# Patient Record
Sex: FEMALE | Race: WHITE | Employment: UNEMPLOYED | URBAN - METROPOLITAN AREA
[De-identification: names, ages, dates, MRNs, and addresses within clinical notes are randomized per-mention and may not be internally consistent; named-entity substitution may affect disease eponyms.]

---

## 2017-09-20 ENCOUNTER — HOSPITAL ENCOUNTER (OUTPATIENT)
Dept: MRI IMAGING | Age: 47
Discharge: HOME | End: 2017-09-20
Attending: ORTHOPAEDIC SURGERY
Payer: COMMERCIAL

## 2017-09-20 DIAGNOSIS — M79.672 BILATERAL FOOT PAIN: ICD-10-CM

## 2017-09-20 DIAGNOSIS — M79.671 BILATERAL FOOT PAIN: ICD-10-CM

## 2017-09-20 PROCEDURE — 73718 MRI LOWER EXTREMITY W/O DYE: CPT

## 2017-12-04 ENCOUNTER — HOSPITAL ENCOUNTER (OUTPATIENT)
Dept: ULTRASOUND IMAGING | Age: 47
Discharge: HOME | End: 2017-12-04
Attending: FAMILY MEDICINE
Payer: COMMERCIAL

## 2017-12-04 DIAGNOSIS — E04.1 SOLITARY THYROID NODULE: ICD-10-CM

## 2017-12-04 PROCEDURE — 76536 US EXAM OF HEAD AND NECK: CPT

## 2017-12-12 ENCOUNTER — OFFICE VISIT (OUTPATIENT)
Dept: NEUROLOGY | Age: 47
End: 2017-12-12

## 2017-12-12 VITALS — DIASTOLIC BLOOD PRESSURE: 80 MMHG | WEIGHT: 121 LBS | BODY MASS INDEX: 19.09 KG/M2 | SYSTOLIC BLOOD PRESSURE: 124 MMHG

## 2017-12-12 DIAGNOSIS — R32 BOWEL AND BLADDER INCONTINENCE: ICD-10-CM

## 2017-12-12 DIAGNOSIS — R20.9 SKIN SENSATION DISTURBANCE: ICD-10-CM

## 2017-12-12 DIAGNOSIS — R20.2 PARESTHESIA OF BOTH FEET: Primary | ICD-10-CM

## 2017-12-12 DIAGNOSIS — R20.2 PARESTHESIA OF BOTH HANDS: ICD-10-CM

## 2017-12-12 DIAGNOSIS — R15.9 BOWEL AND BLADDER INCONTINENCE: ICD-10-CM

## 2017-12-12 RX ORDER — GABAPENTIN 300 MG/1
300 CAPSULE ORAL 3 TIMES DAILY
Qty: 90 CAP | Refills: 2 | Status: SHIPPED | OUTPATIENT
Start: 2017-12-12 | End: 2018-01-11

## 2017-12-12 NOTE — PATIENT INSTRUCTIONS

## 2017-12-12 NOTE — LETTER
12/12/2017 2:57 PM 
 
Patient:  Debbie Pereyra YOB: 1970 Date of Visit: 12/12/2017 Dear Uriel Perea MD 
[address] [place] [address] VIA Facsimile: [phone] 
 : 
 
 
Thank you for referring Ms. Em Osman to me for evaluation/treatment. Below are the relevant portions of my assessment and plan of care. If you have questions, please do not hesitate to call me. I look forward to following Ms. Calderon along with you.  
 
 
 
Sincerely, 
 
 
Yakov Richardson, DO

## 2017-12-12 NOTE — PROGRESS NOTES
Tingling/Numbness x18 months  Comes and goes  Bowel incontinence x1 year   Memory problems- Seeing Niya Zaman in April 2018

## 2017-12-12 NOTE — MR AVS SNAPSHOT
Visit Information Date & Time Provider Department Dept. Phone Encounter #  
 12/12/2017  2:00 PM [facility] at [address] [place] [address] Road [id number] Follow-up Instructions Return in about 6 weeks (around 1/23/2018). Your Appointments 4/27/2018  2:20 PM  
New Patient with Nolberto Weber PsyD 1989 [facility] ([facility]) Appt Note: NP Psyc Test Eval CP:PD: 11/21/17 DJ REF: Dr.Short Sheridan [address] [facility] 99 [id number] [phone]  
  
   
 Fatimah 1921 [facility] [address] [id number] [address] Chloride Upcoming Health Maintenance Date Due Pneumococcal 19-64 Medium Risk (1 of 1 - PPSV23) 7/28/1991 DTaP/Tdap/Td series (1 - Tdap) 7/28/1993 PAP AKA CERVICAL CYTOLOGY 1/16/2015 Influenza Age 5 to Adult 8/1/2017 Allergies as of 12/12/2017  Review Complete On: 12/12/2017 By: Calista Baum,  No Known Allergies Current Immunizations  Never Reviewed No immunizations on file. Not reviewed this visit You Were Diagnosed With   
  
 Codes Comments Paresthesia of both feet    -  Primary ICD-10-CM: R20.2 ICD-9-CM: 782.0 Paresthesia of both hands     ICD-10-CM: R20.2 ICD-9-CM: 782.0 Skin sensation disturbance     ICD-10-CM: R20.9 ICD-9-CM: 782.0 Bowel and bladder incontinence     ICD-10-CM: R32, R15.9 ICD-9-CM: 788.30, 787.60 Vitals BP Weight(growth percentile) BMI OB Status Smoking Status 124/80 121 lb (54.9 kg) 19.09 kg/m2 Having regular periods Former Smoker BMI and BSA Data Body Mass Index Body Surface Area 19.09 kg/m 2 1.61 m 2 Preferred Pharmacy Pharmacy Name Phone [id number] [place] [address] [facility] [phone] Your Updated Medication List  
  
   
This list is accurate as of: 12/12/17  2:31 PM.  Always use your most recent med list.  
  
  
  
  
 escitalopram oxalate 10 mg tablet Commonly known as:  Mayo Hernandez TAKE 1 TABLET BY MOUTH DAILY We Performed the Following COPPER [id number] CPT(R)] GGT [91721 CPT(R)] HEMOGLOBIN A1C WITH EAG [96857 CPT(R)] METHYLMALONIC ACID [05083 CPT(R)] PTH INTACT [86502 CPT(R)] REFERRAL TO NEUROLOGY [VOB33 Custom] Comments:  
 Please evaluate patient for skin biopsy. To be scheduled after MRI's. THYROID PANEL W/TSH [10672 CPT(R)] VITAMIN B1, WHOLE BLOOD [id number] CPT(R)] VITAMIN B12 [id number] CPT(R)] VITAMIN B6 H2370300 CPT(R)] ZINC [id number] CPT(R)] Follow-up Instructions Return in about 6 weeks (around 1/23/2018). To-Do List   
 12/12/2017 Imaging:  MRI BRAIN W WO CONT   
  
 12/12/2017 Imaging:  MRI CERV SPINE W WO CONT   
  
 12/12/2017 Imaging:  MRI [facility] SPINE W WO CONT Referral Information Referral ID Referred By Referred To  
  
 [id number] Ant Brewer MD   
   [address] [place] [address] Phone: [phone] Fax: [phone] Visits Status Start Date End Date 1 New Request 12/12/17 12/12/18 If your referral has a status of pending review or denied, additional information will be sent to support the outcome of this decision. Patient Instructions A Healthy Lifestyle: Care Instructions Your Care Instructions A healthy lifestyle can help you feel good, stay at a healthy weight, and have plenty of energy for both work and play. A healthy lifestyle is something you can share with your whole family. A healthy lifestyle also can lower your risk for serious health problems, such as high blood pressure, heart disease, and diabetes. You can follow a few steps listed below to improve your health and the health of your family. Follow-up care is a key part of your treatment and safety.  Be sure to make and go to all appointments, and call your doctor if you are having problems. It's also a good idea to know your test results and keep a list of the medicines you take. How can you care for yourself at home? · Do not eat too much sugar, fat, or fast foods. You can still have dessert and treats now and then. The goal is moderation. · Start small to improve your eating habits. Pay attention to portion sizes, drink less juice and soda pop, and eat more fruits and vegetables. ¨ Eat a healthy amount of food. A 3-ounce serving of meat, for example, is about the size of a deck of cards. Fill the rest of your plate with vegetables and whole grains. ¨ Limit the amount of soda and sports drinks you have every day. Drink more water when you are thirsty. ¨ Eat at least 5 servings of fruits and vegetables every day. It may seem like a lot, but it is not hard to reach this goal. A serving or helping is 1 piece of fruit, 1 cup of vegetables, or 2 cups of leafy, raw vegetables. Have an apple or some carrot sticks as an afternoon snack instead of a candy bar. Try to have fruits and/or vegetables at every meal. 
· Make exercise part of your daily routine. You may want to start with simple activities, such as walking, bicycling, or slow swimming. Try to be active 30 to 60 minutes every day. You do not need to do all 30 to 60 minutes all at once. For example, you can exercise 3 times a day for 10 or 20 minutes. Moderate exercise is safe for most people, but it is always a good idea to talk to your doctor before starting an exercise program. 
· Keep moving. [place] Feeling the lawn, work in the garden, or 2Catalyze. Take the stairs instead of the elevator at work. · If you smoke, quit. People who smoke have an increased risk for heart attack, stroke, cancer, and other lung illnesses. Quitting is hard, but there are ways to boost your chance of quitting tobacco for good. ¨ Use nicotine gum, patches, or lozenges. ¨ Ask your doctor about stop-smoking programs and medicines. ¨ Keep trying. In addition to reducing your risk of diseases in the future, you will notice some benefits soon after you stop using tobacco. If you have shortness of breath or asthma symptoms, they will likely get better within a few weeks after you quit. · Limit how much alcohol you drink. Moderate amounts of alcohol (up to 2 drinks a day for men, 1 drink a day for women) are okay. But drinking too much can lead to liver problems, high blood pressure, and other health problems. Family health If you have a family, there are many things you can do together to improve your health. · Eat meals together as a family as often as possible. · Eat healthy foods. This includes fruits, vegetables, lean meats and dairy, and whole grains. · Include your family in your fitness plan. Most people think of activities such as jogging or tennis as the way to fitness, but there are many ways you and your family can be more active. Anything that makes you breathe hard and gets your heart pumping is exercise. Here are some tips: 
¨ Walk to do errands or to take your child to school or the bus. ¨ Go for a family bike ride after dinner instead of watching TV. Where can you learn more? Go to http://jose-vidal.info/. Enter R462 in the search box to learn more about \"A Healthy Lifestyle: Care Instructions. \" Current as of: May 12, 2017 Content Version: 11.4 © 7444-2906 [employer]. Care instructions adapted under license by [employer] (which disclaims liability or warranty for this information). If you have questions about a medical condition or this instruction, always ask your healthcare professional. [address] any warranty or liability for your use of this information. Introducing [facility]! Dear Leni Ellsworth: Thank you for requesting a Solmentum account.   Our records indicate that you already have an active EmSense account. You can access your account anytime at [email or web address] Did you know that you can access your hospital and ER discharge instructions at any time in EmSense? You can also review all of your test results from your hospital stay or ER visit. Additional Information If you have questions, please visit the Frequently Asked Questions section of the EmSense website at [email or web address] Remember, EmSense is NOT to be used for urgent needs. For medical emergencies, dial 911. Now available from your iPhone and Android! Please provide this summary of care documentation to your next provider. Your primary care clinician is listed as Marco Wong. If you have any questions after today's visit, please call [phone].
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
## 2017-12-15 LAB
COPPER SERPL-MCNC: 85 UG/DL (ref 72–166)
EST. AVERAGE GLUCOSE BLD GHB EST-MCNC: 97 MG/DL
FT4I SERPL CALC-MCNC: 1.6 (ref 1.2–4.9)
GGT SERPL-CCNC: 13 IU/L (ref 0–60)
HBA1C MFR BLD: 5 % (ref 4.8–5.6)
PTH-INTACT SERPL-MCNC: 38 PG/ML (ref 15–65)
T3RU NFR SERPL: 27 % (ref 24–39)
T4 SERPL-MCNC: 6 UG/DL (ref 4.5–12)
TSH SERPL DL<=0.005 MIU/L-ACNC: 2.46 UIU/ML (ref 0.45–4.5)
VIT B1 BLD-SCNC: 77.5 NMOL/L (ref 66.5–200)
VIT B12 SERPL-MCNC: 432 PG/ML (ref 232–1245)
VIT B6 SERPL-MCNC: 15.2 UG/L (ref 2–32.8)
ZINC SERPL-MCNC: 52 UG/DL (ref 56–134)

## 2017-12-23 ENCOUNTER — HOSPITAL ENCOUNTER (OUTPATIENT)
Dept: MRI IMAGING | Age: 47
Discharge: HOME | End: 2017-12-23
Attending: PSYCHIATRY & NEUROLOGY
Payer: COMMERCIAL

## 2017-12-23 DIAGNOSIS — R20.2 PARESTHESIA OF BOTH HANDS: ICD-10-CM

## 2017-12-23 DIAGNOSIS — R20.2 PARESTHESIA OF BOTH FEET: ICD-10-CM

## 2017-12-23 DIAGNOSIS — R32 BOWEL AND BLADDER INCONTINENCE: ICD-10-CM

## 2017-12-23 DIAGNOSIS — R15.9 BOWEL AND BLADDER INCONTINENCE: ICD-10-CM

## 2017-12-23 DIAGNOSIS — R20.9 SKIN SENSATION DISTURBANCE: ICD-10-CM

## 2017-12-23 PROCEDURE — 72157 MRI CHEST SPINE W/O & W/DYE: CPT

## 2017-12-23 PROCEDURE — 74011250636 HC RX REV CODE- 250/636: Performed by: PSYCHIATRY & NEUROLOGY

## 2017-12-23 PROCEDURE — A9576 INJ PROHANCE MULTIPACK: HCPCS | Performed by: PSYCHIATRY & NEUROLOGY

## 2017-12-23 PROCEDURE — 72156 MRI NECK SPINE W/O & W/DYE: CPT

## 2017-12-23 PROCEDURE — 70553 MRI BRAIN STEM W/O & W/DYE: CPT

## 2017-12-23 RX ADMIN — GADOTERIDOL 11 ML: 279.3 INJECTION, SOLUTION INTRAVENOUS at 13:00

## 2017-12-26 NOTE — TELEPHONE ENCOUNTER
Requested Prescriptions     Pending Prescriptions Disp Refills    gabapentin (NEURONTIN) 300 mg capsule 90 Cap 2     Sig: Take 1 Cap by mouth three (3) times daily.

## 2017-12-27 ENCOUNTER — TELEPHONE (OUTPATIENT)
Dept: NEUROLOGY | Age: 47
End: 2017-12-27

## 2017-12-27 NOTE — TELEPHONE ENCOUNTER
Spoke with patient, she wanted to know if she could increase gabapentin to 600mg 4x a day, also would like MRI results if possible.

## 2017-12-28 ENCOUNTER — TELEPHONE (OUTPATIENT)
Dept: NEUROLOGY | Age: 47
End: 2017-12-28

## 2017-12-28 RX ORDER — GABAPENTIN 300 MG/1
300 CAPSULE ORAL 3 TIMES DAILY
Qty: 90 CAP | Refills: 2 | OUTPATIENT
Start: 2017-12-28

## 2017-12-28 NOTE — TELEPHONE ENCOUNTER
Spoke with patient, informed her  wants her to increase gabapentin to 600mg three times a day, MRI results reviewed and skin biopsy scheduled with  on January 5th.

## 2018-01-02 ENCOUNTER — TELEPHONE (OUTPATIENT)
Dept: NEUROLOGY | Age: 48
End: 2018-01-02

## 2018-01-02 RX ORDER — GABAPENTIN 300 MG/1
600 CAPSULE ORAL 3 TIMES DAILY
Qty: 60 CAP | Refills: 2 | Status: SHIPPED | OUTPATIENT
Start: 2018-01-02 | End: 2018-01-02 | Stop reason: CLARIF

## 2018-01-02 RX ORDER — GABAPENTIN 300 MG/1
600 CAPSULE ORAL 3 TIMES DAILY
Qty: 180 CAP | Refills: 2 | Status: ON HOLD | OUTPATIENT
Start: 2018-01-02 | End: 2021-11-23

## 2018-01-02 NOTE — TELEPHONE ENCOUNTER
Patient c/o muscle spasms to hands and feet which just started this past week. Family states cognition issues are getting worse, she wanted to know if anything can be done before follow up on Jan 24th.

## 2018-01-02 NOTE — TELEPHONE ENCOUNTER
Pt requesting a returned phone call in regards to medication refill of gabapentin 600mg 3x day. Please try pt on both home and mobile.

## 2018-01-02 NOTE — TELEPHONE ENCOUNTER
----- Message from Lux Beatty sent at 1/2/2018 12:48 PM EST -----  Regarding: Dr. Ananya Howard  Pt is returning a call to [facility] regarding a  prescription. Pt would like a call back. Pt can be reached at [phone].

## 2018-01-11 ENCOUNTER — OFFICE VISIT (OUTPATIENT)
Dept: NEUROLOGY | Age: 48
End: 2018-01-11

## 2018-01-11 VITALS
RESPIRATION RATE: 18 BRPM | SYSTOLIC BLOOD PRESSURE: 126 MMHG | OXYGEN SATURATION: 98 % | DIASTOLIC BLOOD PRESSURE: 70 MMHG | HEART RATE: 74 BPM

## 2018-01-11 DIAGNOSIS — R20.2 PARESTHESIA: Primary | ICD-10-CM

## 2018-01-11 DIAGNOSIS — R15.9 INCONTINENCE OF FECES, UNSPECIFIED FECAL INCONTINENCE TYPE: ICD-10-CM

## 2018-01-11 DIAGNOSIS — R41.3 COMPLAINTS OF MEMORY DISTURBANCE: ICD-10-CM

## 2018-01-11 DIAGNOSIS — F41.9 ANXIETY: ICD-10-CM

## 2018-01-11 DIAGNOSIS — R20.2 PARESTHESIA: ICD-10-CM

## 2018-01-11 RX ORDER — AMITRIPTYLINE HYDROCHLORIDE 10 MG/1
TABLET, FILM COATED ORAL
COMMUNITY
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
## 2018-01-11 NOTE — PROGRESS NOTES
Numbness/tingling remain  Jerking to arms/hands x2 weeks   Incontinence to bowel  Back pain  Loose  to hands  Muscle spasms to BLE  Difficulty focusing

## 2018-01-11 NOTE — LETTER
1/11/2018 3:17 PM 
 
Patient:  Massiel Napier YOB: 1970 Date of Visit: 1/11/2018 Dear Latrell Celis MD 
[address] [place] [address] VIA Facsimile: [phone] 
 : 
 
 
I recently had the pleasure of seeing Ms. Capri Dumas for evaluation/treatment. Below are the relevant portions of my assessment and plan of care. If you have questions, please do not hesitate to call me. I look forward to following Ms. Calderon along with you.  
 
 
 
Sincerely, 
 
 
Deysi Bergeron, DO

## 2018-01-11 NOTE — PATIENT INSTRUCTIONS

## 2018-01-11 NOTE — MR AVS SNAPSHOT
Visit Information Date & Time Provider Department Dept. Phone Encounter #  
 1/11/2018  2:30 PM Jose Francisco Lazar, [address] [facility] at [place] [id number] Your Appointments 4/27/2018  2:20 PM  
New Patient with Elidia Almeida PsyD [address] [facility] [address] ([facility]) Appt Note: NP Psyc Test Eval CP:PD: 11/21/17 DJ REF: Dr.Short Sheridan 1921 [address] [address] [phone]  
  
   
 [place] [address] [id number] [address] Upcoming Health Maintenance Date Due Pneumococcal 19-64 Medium Risk (1 of 1 - PPSV23) 7/28/1991 DTaP/Tdap/Td series (1 - Tdap) 7/28/1993 PAP AKA CERVICAL CYTOLOGY 1/16/2015 Influenza Age 5 to Adult 8/1/2017 Allergies as of 1/11/2018  Review Complete On: 1/11/2018 By: Jose Francisco Lazar,  No Known Allergies Current Immunizations  Never Reviewed No immunizations on file. Not reviewed this visit You Were Diagnosed With   
  
 Codes Comments Paresthesia    -  Primary ICD-10-CM: R20.2 ICD-9-CM: 782.0 Incontinence of feces, unspecified fecal incontinence type     ICD-10-CM: R15.9 ICD-9-CM: 787.60 Complaints of memory disturbance     ICD-10-CM: R41.3 ICD-9-CM: 780.93 Anxiety     ICD-10-CM: F41.9 ICD-9-CM: 300.00 Vitals BP Pulse Resp SpO2 OB Status Smoking Status 126/70 74 18 98% Having regular periods Former Smoker Preferred Pharmacy Pharmacy Name Phone [id number] [address] Your Updated Medication List  
  
   
This list is accurate as of: 1/11/18  3:08 PM.  Always use your most recent med list.  
  
  
  
  
 amitriptyline 10 mg tablet Commonly known as:  ELAVIL Take  by mouth nightly. escitalopram oxalate 10 mg tablet Commonly known as:  Myra Charo TAKE 1 TABLET BY MOUTH DAILY gabapentin 300 mg capsule Commonly known as:  NEURONTIN Take 2 Caps by mouth three (3) times daily. We Performed the Following REYNALDO IFA W/REFLEX  CASCADE [54062 CPT(R)] REFERRAL TO NEUROLOGY [IDA38 Custom] Comments:  
 Please evaluate patient for skin biopsy To-Do List   
 01/11/2018 Lab:  [place] AB TOTAL W/RFLX W BLOT   
  
 01/11/2018 Imaging:  MRI LUMB SPINE W CONT Referral Information Referral ID Referred By Referred To  
  
 [id number] Mj Dailey MD   
   [address] [place] [address] Drive, [address] [place] [address] Phone: [phone] Fax: [phone] Visits Status Start Date End Date 1 New Request 1/11/18 1/11/19 If your referral has a status of pending review or denied, additional information will be sent to support the outcome of this decision. Patient Instructions A Healthy Lifestyle: Care Instructions Your Care Instructions A healthy lifestyle can help you feel good, stay at a healthy weight, and have plenty of energy for both work and play. A healthy lifestyle is something you can share with your whole family. A healthy lifestyle also can lower your risk for serious health problems, such as high blood pressure, heart disease, and diabetes. You can follow a few steps listed below to improve your health and the health of your family. Follow-up care is a key part of your treatment and safety. Be sure to make and go to all appointments, and call your doctor if you are having problems. It's also a good idea to know your test results and keep a list of the medicines you take. How can you care for yourself at home? · Do not eat too much sugar, fat, or fast foods. You can still have dessert and treats now and then. The goal is moderation. · Start small to improve your eating habits. Pay attention to portion sizes, drink less juice and soda pop, and eat more fruits and vegetables. ¨ Eat a healthy amount of food. A 3-ounce serving of meat, for example, is about the size of a deck of cards. Fill the rest of your plate with vegetables and whole grains. ¨ Limit the amount of soda and sports drinks you have every day. Drink more water when you are thirsty. ¨ Eat at least 5 servings of fruits and vegetables every day. It may seem like a lot, but it is not hard to reach this goal. A serving or helping is 1 piece of fruit, 1 cup of vegetables, or 2 cups of leafy, raw vegetables. Have an apple or some carrot sticks as an afternoon snack instead of a candy bar. Try to have fruits and/or vegetables at every meal. 
· Make exercise part of your daily routine. You may want to start with simple activities, such as walking, bicycling, or slow swimming. Try to be active 30 to 60 minutes every day. You do not need to do all 30 to 60 minutes all at once. For example, you can exercise 3 times a day for 10 or 20 minutes. Moderate exercise is safe for most people, but it is always a good idea to talk to your doctor before starting an exercise program. 
· Keep moving. [place] Harpin the lawn, work in the garden, or [place]. Take the stairs instead of the elevator at work. · If you smoke, quit. People who smoke have an increased risk for heart attack, stroke, cancer, and other lung illnesses. Quitting is hard, but there are ways to boost your chance of quitting tobacco for good. ¨ Use nicotine gum, patches, or lozenges. ¨ Ask your doctor about stop-smoking programs and medicines. ¨ Keep trying. In addition to reducing your risk of diseases in the future, you will notice some benefits soon after you stop using tobacco. If you have shortness of breath or asthma symptoms, they will likely get better within a few weeks after you quit. · Limit how much alcohol you drink. Moderate amounts of alcohol (up to 2 drinks a day for men, 1 drink a day for women) are okay.  But drinking too much can lead to liver problems, high blood pressure, and other health problems. Family health If you have a family, there are many things you can do together to improve your health. · Eat meals together as a family as often as possible. · Eat healthy foods. This includes fruits, vegetables, lean meats and dairy, and whole grains. · Include your family in your fitness plan. Most people think of activities such as jogging or tennis as the way to fitness, but there are many ways you and your family can be more active. Anything that makes you breathe hard and gets your heart pumping is exercise. Here are some tips: 
¨ Walk to do errands or to take your child to school or the bus. ¨ Go for a family bike ride after dinner instead of watching TV. Where can you learn more? Go to http://jose-vidal.info/. Enter D970 in the search box to learn more about \"A Healthy Lifestyle: Care Instructions. \" Current as of: May 12, 2017 Content Version: 11.4 © 4585-7926 VisualCV. Care instructions adapted under license by [employer] (which disclaims liability or warranty for this information). If you have questions about a medical condition or this instruction, always ask your healthcare professional. [address] any warranty or liability for your use of this information. Introducing [facility] [place] [facility]! Dear Robbie John: Thank you for requesting a [employer] account. Our records indicate that you already have an active [employer] account. You can access your account anytime at [email or web address] Did you know that you can access your hospital and ER discharge instructions at any time in Siemens? You can also review all of your test results from your hospital stay or ER visit. Additional Information If you have questions, please visit the Frequently Asked Questions section of the FeedHenry website at [email or web address] Remember, [employer] is NOT to be used for urgent needs. For medical emergencies, dial 911. Now available from your iPhone and Android! Please provide this summary of care documentation to your next provider. [place] Disease Testing Disclaimer:   
 § 73.1-6009.7. (Expires July 1, 2018) [place] disease testing information disclosure. A. Every licensee or his in-office designee who orders a laboratory test for the presence of [place] disease shall provide to the patient or his legal representative the following written information: \"ACCORDING TO THE CENTERS FOR DISEASE CONTROL AND PREVENTION, AS OF 2011 L[place] DISEASE IS THE SIXTH FASTEST GROWING DISEASE IN THE [place]. YOUR HEALTH CARE PROVIDER HAS ORDERED A LABORATORY TEST FOR THE PRESENCE OF [place]ME DISEASE FOR YOU. CURRENT LABORATORY TESTING FOR L[place]ME DISEASE CAN BE PROBLEMATIC AND STANDARD LABORATORY TESTS OFTEN RESULT IN FALSE NEGATIVE AND FALSE POSITIVE RESULTS, AND IF DONE TOO EARLY, YOU MAY NOT HAVE PRODUCED ENOUGH ANTIBODIES TO BE CONSIDERED POSITIVE BECAUSE YOUR IMMUNE RESPONSE REQUIRES TIME TO DEVELOP ANTIBODIES. IF YOU ARE TESTED FOR L[place]ME DISEASE, AND THE RESULTS ARE NEGATIVE, THIS DOES NOT NECESSARILY MEAN YOU DO NOT HAVE L[place]ME DISEASE. IF YOU CONTINUE TO EXPERIENCE SYMPTOMS, YOU SHOULD CONTACT YOUR HEALTH CARE PROVIDER AND INQUIRE ABOUT THE APPROPRIATENESS OF RETESTING OR ADDITIONAL TREATMENT. \"  
B. Licensees shall be immune from civil liability for the provision of the written information required by this section absent gross negligence or willful misconduct. Your primary care clinician is listed as Rodrigo Szymanski. If you have any questions after today's visit, please call [phone].

## 2018-01-12 ENCOUNTER — TELEPHONE (OUTPATIENT)
Dept: NEUROLOGY | Age: 48
End: 2018-01-12

## 2018-04-27 ENCOUNTER — OFFICE VISIT (OUTPATIENT)
Dept: NEUROLOGY | Age: 48
End: 2018-04-27

## 2018-04-27 DIAGNOSIS — R15.9 INCONTINENCE OF FECES, UNSPECIFIED FECAL INCONTINENCE TYPE: ICD-10-CM

## 2018-04-27 DIAGNOSIS — R47.89 WORD FINDING DIFFICULTY: ICD-10-CM

## 2018-04-27 DIAGNOSIS — F41.9 ANXIETY AND DEPRESSION: ICD-10-CM

## 2018-04-27 DIAGNOSIS — R41.3 MEMORY LOSS: ICD-10-CM

## 2018-04-27 DIAGNOSIS — R20.2 PARESTHESIA: ICD-10-CM

## 2018-04-27 DIAGNOSIS — F07.81 POST CONCUSSION SYNDROME: Primary | ICD-10-CM

## 2018-04-27 DIAGNOSIS — F10.21 ALCOHOL DEPENDENCE IN SUSTAINED FULL REMISSION (HCC): ICD-10-CM

## 2018-04-27 DIAGNOSIS — F32.A ANXIETY AND DEPRESSION: ICD-10-CM
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
## 2018-05-03 ENCOUNTER — OFFICE VISIT (OUTPATIENT)
Dept: NEUROLOGY | Age: 48
End: 2018-05-03

## 2018-05-03 DIAGNOSIS — F32.A DEPRESSION WITH SOMATIZATION: ICD-10-CM

## 2018-05-03 DIAGNOSIS — Z87.828 HISTORY OF TRAUMATIC HEAD INJURY: ICD-10-CM

## 2018-05-03 DIAGNOSIS — F10.21 ALCOHOL DEPENDENCE IN SUSTAINED FULL REMISSION (HCC): ICD-10-CM

## 2018-05-03 DIAGNOSIS — F45.0 DEPRESSION WITH SOMATIZATION: ICD-10-CM

## 2018-05-03 DIAGNOSIS — F07.81 POST CONCUSSION SYNDROME: Primary | ICD-10-CM

## 2018-05-03 DIAGNOSIS — Z63.0 RELATIONSHIP PROBLEM BETWEEN PARTNERS: ICD-10-CM

## 2018-05-03 DIAGNOSIS — F41.1 GENERALIZED ANXIETY DISORDER: ICD-10-CM

## 2018-05-03 SDOH — SOCIAL STABILITY - SOCIAL INSECURITY: PROBLEMS IN RELATIONSHIP WITH SPOUSE OR PARTNER: Z63.0
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
## 2021-02-14 ENCOUNTER — HOSPITAL ENCOUNTER (EMERGENCY)
Age: 51
Discharge: HOME | End: 2021-02-14
Attending: EMERGENCY MEDICINE | Admitting: EMERGENCY MEDICINE
Payer: COMMERCIAL

## 2021-02-14 ENCOUNTER — APPOINTMENT (OUTPATIENT)
Dept: GENERAL RADIOLOGY | Age: 51
End: 2021-02-14
Attending: EMERGENCY MEDICINE
Payer: COMMERCIAL

## 2021-02-14 VITALS
HEIGHT: 68 IN | TEMPERATURE: 98.1 F | OXYGEN SATURATION: 98 % | HEART RATE: 95 BPM | WEIGHT: 116.4 LBS | BODY MASS INDEX: 17.64 KG/M2 | RESPIRATION RATE: 12 BRPM | DIASTOLIC BLOOD PRESSURE: 82 MMHG | SYSTOLIC BLOOD PRESSURE: 108 MMHG

## 2021-02-14 DIAGNOSIS — N17.9 AKI (ACUTE KIDNEY INJURY) (HCC): ICD-10-CM

## 2021-02-14 DIAGNOSIS — K52.9 GASTROENTERITIS, ACUTE: Primary | ICD-10-CM

## 2021-02-14 LAB
ALBUMIN SERPL-MCNC: 4.2 G/DL (ref 3.5–5)
ALBUMIN/GLOB SERPL: 1.1 {RATIO} (ref 1.1–2.2)
ALP SERPL-CCNC: 66 U/L (ref 45–117)
ALT SERPL-CCNC: 25 U/L (ref 12–78)
ANION GAP SERPL CALC-SCNC: 17 MMOL/L (ref 5–15)
APPEARANCE UR: ABNORMAL
AST SERPL-CCNC: 33 U/L (ref 15–37)
BACTERIA URNS QL MICRO: ABNORMAL /HPF
BASOPHILS # BLD: 0 K/UL (ref 0–0.1)
BASOPHILS NFR BLD: 0 % (ref 0–1)
BILIRUB SERPL-MCNC: 0.4 MG/DL (ref 0.2–1)
BILIRUB UR QL: NEGATIVE
BNP SERPL-MCNC: 158 PG/ML (ref 0–125)
BUN SERPL-MCNC: 41 MG/DL (ref 6–20)
BUN/CREAT SERPL: 12 (ref 12–20)
CALCIUM SERPL-MCNC: 9.1 MG/DL (ref 8.5–10.1)
CHLORIDE SERPL-SCNC: 96 MMOL/L (ref 97–108)
CO2 SERPL-SCNC: 21 MMOL/L (ref 21–32)
COLOR UR: ABNORMAL
CREAT SERPL-MCNC: 3.53 MG/DL (ref 0.55–1.02)
CRP SERPL-MCNC: <0.29 MG/DL
D DIMER PPP FEU-MCNC: 0.82 MG/L FEU (ref 0–0.65)
DIFFERENTIAL METHOD BLD: ABNORMAL
EOSINOPHIL # BLD: 0 K/UL (ref 0–0.4)
EOSINOPHIL NFR BLD: 0 % (ref 0–7)
EPITH CASTS URNS QL MICRO: ABNORMAL /LPF
ERYTHROCYTE [DISTWIDTH] IN BLOOD BY AUTOMATED COUNT: 16.8 % (ref 11.5–14.5)
ETHANOL SERPL-MCNC: <10 MG/DL
GLOBULIN SER CALC-MCNC: 3.7 G/DL (ref 2–4)
GLUCOSE SERPL-MCNC: 99 MG/DL (ref 65–100)
GLUCOSE UR STRIP.AUTO-MCNC: NEGATIVE MG/DL
HCT VFR BLD AUTO: 45.2 % (ref 35–47)
HGB BLD-MCNC: 14.8 G/DL (ref 11.5–16)
HGB UR QL STRIP: NEGATIVE
IMM GRANULOCYTES # BLD AUTO: 0.1 K/UL (ref 0–0.04)
IMM GRANULOCYTES NFR BLD AUTO: 0 % (ref 0–0.5)
KETONES UR QL STRIP.AUTO: ABNORMAL MG/DL
LACTATE SERPL-SCNC: 2 MMOL/L (ref 0.4–2)
LEUKOCYTE ESTERASE UR QL STRIP.AUTO: ABNORMAL
LYMPHOCYTES # BLD: 1.4 K/UL (ref 0.8–3.5)
LYMPHOCYTES NFR BLD: 11 % (ref 12–49)
MCH RBC QN AUTO: 25.7 PG (ref 26–34)
MCHC RBC AUTO-ENTMCNC: 32.7 G/DL (ref 30–36.5)
MCV RBC AUTO: 78.5 FL (ref 80–99)
MONOCYTES # BLD: 0.5 K/UL (ref 0–1)
MONOCYTES NFR BLD: 4 % (ref 5–13)
NEUTS SEG # BLD: 10.4 K/UL (ref 1.8–8)
NEUTS SEG NFR BLD: 84 % (ref 32–75)
NITRITE UR QL STRIP.AUTO: NEGATIVE
NRBC # BLD: 0 K/UL (ref 0–0.01)
NRBC BLD-RTO: 0 PER 100 WBC
OTHER,OTHU: ABNORMAL
PH UR STRIP: 6 [PH] (ref 5–8)
PLATELET # BLD AUTO: ABNORMAL K/UL (ref 150–400)
POTASSIUM SERPL-SCNC: 2.9 MMOL/L (ref 3.5–5.1)
PROT SERPL-MCNC: 7.9 G/DL (ref 6.4–8.2)
PROT UR STRIP-MCNC: NEGATIVE MG/DL
RBC # BLD AUTO: 5.76 M/UL (ref 3.8–5.2)
RBC #/AREA URNS HPF: ABNORMAL /HPF (ref 0–5)
SODIUM SERPL-SCNC: 134 MMOL/L (ref 136–145)
SP GR UR REFRACTOMETRY: 1.01 (ref 1–1.03)
TROPONIN I SERPL-MCNC: <0.05 NG/ML
UA: UC IF INDICATED,UAUC: ABNORMAL
UROBILINOGEN UR QL STRIP.AUTO: 0.2 EU/DL (ref 0.2–1)
WBC # BLD AUTO: 12.4 K/UL (ref 3.6–11)
WBC URNS QL MICRO: ABNORMAL /HPF (ref 0–4)

## 2021-02-14 PROCEDURE — 96360 HYDRATION IV INFUSION INIT: CPT

## 2021-02-14 PROCEDURE — 71045 X-RAY EXAM CHEST 1 VIEW: CPT

## 2021-02-14 PROCEDURE — 87040 BLOOD CULTURE FOR BACTERIA: CPT

## 2021-02-14 PROCEDURE — 86140 C-REACTIVE PROTEIN: CPT

## 2021-02-14 PROCEDURE — 83880 ASSAY OF NATRIURETIC PEPTIDE: CPT

## 2021-02-14 PROCEDURE — 85025 COMPLETE CBC W/AUTO DIFF WBC: CPT

## 2021-02-14 PROCEDURE — 85379 FIBRIN DEGRADATION QUANT: CPT

## 2021-02-14 PROCEDURE — 84484 ASSAY OF TROPONIN QUANT: CPT

## 2021-02-14 PROCEDURE — 99285 EMERGENCY DEPT VISIT HI MDM: CPT

## 2021-02-14 PROCEDURE — 82077 ASSAY SPEC XCP UR&BREATH IA: CPT

## 2021-02-14 PROCEDURE — 81001 URINALYSIS AUTO W/SCOPE: CPT

## 2021-02-14 PROCEDURE — 36415 COLL VENOUS BLD VENIPUNCTURE: CPT

## 2021-02-14 PROCEDURE — 83605 ASSAY OF LACTIC ACID: CPT

## 2021-02-14 PROCEDURE — 74011250636 HC RX REV CODE- 250/636: Performed by: EMERGENCY MEDICINE

## 2021-02-14 PROCEDURE — 93005 ELECTROCARDIOGRAM TRACING: CPT

## 2021-02-14 PROCEDURE — 80053 COMPREHEN METABOLIC PANEL: CPT

## 2021-02-14 RX ORDER — ALBUTEROL SULFATE 90 UG/1
AEROSOL, METERED RESPIRATORY (INHALATION)
COMMUNITY
Start: 2021-01-18

## 2021-02-14 RX ORDER — BUSPIRONE HYDROCHLORIDE 10 MG/1
15 TABLET ORAL 2 TIMES DAILY
Status: ON HOLD | COMMUNITY
Start: 2021-01-18 | End: 2021-11-23

## 2021-02-14 RX ORDER — FLUDROCORTISONE ACETATE 0.1 MG/1
TABLET ORAL
COMMUNITY
Start: 2021-01-22 | End: 2021-02-22

## 2021-02-14 RX ORDER — VENLAFAXINE HYDROCHLORIDE 150 MG/1
150 CAPSULE, EXTENDED RELEASE ORAL DAILY
COMMUNITY
Start: 2021-02-09

## 2021-02-14 RX ORDER — PREGABALIN 50 MG/1
50 CAPSULE ORAL
COMMUNITY

## 2021-02-14 RX ADMIN — SODIUM CHLORIDE 1000 ML: 9 INJECTION, SOLUTION INTRAVENOUS at 16:33

## 2021-02-14 RX ADMIN — SODIUM CHLORIDE 500 ML: 9 INJECTION, SOLUTION INTRAVENOUS at 18:55

## 2021-02-14 NOTE — ED PROVIDER NOTES
HPI   The patient is a 57-year-old white female with a history of depression chronic kidney disease and status post Covid diagnosed 3 weeks prior to admission when she was evaluated for hypotension. She has since recovered from her Covid but continues with hypotension to the point where she gets very weak. She has no fevers or chills she denies chest pain or shortness of breath. She has a history of CKD and depression. She also had nausea vomiting and diarrhea for about the past 3 days.   Past Medical History:   Diagnosis Date    Chronic kidney disease     right kidney function 8%,  size    Depression     Hypertension     Seizures (HCC)        Past Surgical History:   Procedure Laterality Date    HX GYN      4 pregnancies/ 3 children/1 miscarriage    [place] BREAST SURGERY PROCEDURE UNLISTED  2007    augmentation         Family History:   Problem Relation Age of Onset    Cancer Mother         myelofibrosis    Depression Father     Diabetes Maternal Grandfather     Cancer Paternal Grandmother         brain tumor and lung        Social History     Socioeconomic History    Marital status:      Spouse name: Not on file    Number of children: Not on file    Years of education: Not on file    Highest education level: Not on file   Occupational History    Not on file   Social Needs    Financial resource strain: Not on file    Food insecurity     Worry: Not on file     Inability: Not on file    Transportation needs     Medical: Not on file     Non-medical: Not on file   Tobacco Use    Smoking status: Former Smoker     Types: Cigarettes     Quit date: 1996     Years since quittin.1    Smokeless tobacco: Never Used   Substance and Sexual Activity    Alcohol use: Not Currently     Alcohol/week: 20.8 standard drinks     Types: 25 Glasses of wine per week     Frequency: Never    Drug use: No    Sexual activity: Yes     Partners: Male   Lifestyle    Physical activity     Days per week: Not on file     Minutes per session: Not on file    Stress: Not on file   Relationships    Social connections     Talks on phone: Not on file     Gets together: Not on file     Attends Scientologist service: Not on file     Active member of club or organization: Not on file     Attends meetings of clubs or organizations: Not on file     Relationship status: Not on file    Intimate partner violence     Fear of current or ex partner: Not on file     Emotionally abused: Not on file     Physically abused: Not on file     Forced sexual activity: Not on file   Other Topics Concern    Not on file   Social History Narrative    Not on file         ALLERGIES: Patient has no known allergies. Review of Systems   All other systems reviewed and are negative. Vitals:    02/14/21 1454   BP: (!) 86/76   Pulse: (!) 110   Resp: 18   Temp: 97.4 °F (36.3 °C)   SpO2: 100%   Weight: 52.8 kg (116 lb 6.5 oz)   Height: 5' 8\" (1.727 m)            Physical Exam  Vitals signs and nursing note reviewed. Constitutional:       Appearance: She is well-developed. HENT:      Head: Normocephalic and atraumatic. Mouth/Throat:      Pharynx: No oropharyngeal exudate. Eyes:      General: No scleral icterus. Conjunctiva/sclera: Conjunctivae normal.   Neck:      Musculoskeletal: Neck supple. Thyroid: No thyromegaly. Cardiovascular:      Rate and Rhythm: Normal rate and regular rhythm. Heart sounds: Normal heart sounds. No murmur. No friction rub. No gallop. Pulmonary:      Effort: Pulmonary effort is normal. No respiratory distress. Breath sounds: Normal breath sounds. No stridor. No wheezing or rales. Abdominal:      General: Bowel sounds are normal.      Palpations: Abdomen is soft. Tenderness: There is no abdominal tenderness. There is no guarding or rebound. Musculoskeletal: Normal range of motion. Lymphadenopathy:      Cervical: No cervical adenopathy. Skin:     General: Skin is warm and dry. Neurological:      Mental Status: She is alert and oriented to person, place, and time. MDM  Number of Diagnoses or Management Options     Amount and/or Complexity of Data Reviewed  Clinical lab tests: ordered and reviewed  Tests in the radiology section of CPT®: ordered and reviewed  Tests in the medicine section of CPT®: ordered and reviewed    Risk of Complications, Morbidity, and/or Mortality  Presenting problems: moderate  Diagnostic procedures: moderate  Management options: moderate           Procedures          Assessment and plan the patient has chronic kidney disease with a GFR of about 24 according to her. Today her GFR is 15 with dehydration secondary to nausea vomiting and diarrhea.   She responded well to IV fluids and her abdomen is benign to exam I will discharge her home with close follow-up by her PCP and her nephrologist.

## 2021-02-14 NOTE — Clinical Note
[address] 
[facility] 
[address] [address] [phone] Work/School Note Date: 2/14/2021 To Whom It May concern: 
 
Krysten Bran was seen and treated today in the emergency room by the following provider(s): 
Attending Provider: Cayla Solis MD. Krysten Bran is excused from work/school on 02/14/21 and 02/15/21. She is medically clear to return to work/school on 2/16/2021. Sincerely, Clementina Taveras MD

## 2021-02-14 NOTE — ED TRIAGE NOTES
Pt c/o feeling weak, dizziness, n/v/d for the last 10 days. Had COVID 3 weeks ago. Pt has hx of kidney disease. Is supposed to see cardiology due to hypotension but had to wait till she was 3 weeks post covid. Denies pain at this time,.

## 2021-02-14 NOTE — ED NOTES
Pt. Resting and states she feels better, still unable to urinate. Additional fluids hung.    Call button within reach

## 2021-02-15 LAB
ATRIAL RATE: 104 BPM
CALCULATED P AXIS, ECG09: 79 DEGREES
CALCULATED R AXIS, ECG10: 62 DEGREES
CALCULATED T AXIS, ECG11: 83 DEGREES
DIAGNOSIS, 93000: NORMAL
P-R INTERVAL, ECG05: 132 MS
Q-T INTERVAL, ECG07: 366 MS
QRS DURATION, ECG06: 80 MS
QTC CALCULATION (BEZET), ECG08: 481 MS
VENTRICULAR RATE, ECG03: 104 BPM

## 2021-02-15 NOTE — ED NOTES
The patient was discharged home by Dr Dorian Drake in stable condition. The patient is alert and oriented, in no respiratory distress and discharge vital signs obtained. The patient's diagnosis, condition and treatment were explained. The patient expressed understanding. A discharge plan has been developed. Aftercare instructions were given. Pt ambulatory out of the ED.

## 2021-02-19 LAB
BACTERIA SPEC CULT: NORMAL
SERVICE CMNT-IMP: NORMAL

## 2021-02-22 ENCOUNTER — OFFICE VISIT (OUTPATIENT)
Dept: CARDIOLOGY CLINIC | Age: 51
End: 2021-02-22
Payer: COMMERCIAL

## 2021-02-22 VITALS
WEIGHT: 115.4 LBS | OXYGEN SATURATION: 97 % | RESPIRATION RATE: 18 BRPM | HEIGHT: 68 IN | BODY MASS INDEX: 17.49 KG/M2 | DIASTOLIC BLOOD PRESSURE: 50 MMHG | HEART RATE: 89 BPM | SYSTOLIC BLOOD PRESSURE: 98 MMHG

## 2021-02-22 DIAGNOSIS — Z82.49 FAMILY HISTORY OF CORONARY ARTERIOSCLEROSIS: ICD-10-CM

## 2021-02-22 DIAGNOSIS — N18.4 CKD (CHRONIC KIDNEY DISEASE) STAGE 4, GFR 15-29 ML/MIN (HCC): ICD-10-CM

## 2021-02-22 DIAGNOSIS — R07.81 PLEURITIC CHEST PAIN: ICD-10-CM

## 2021-02-22 DIAGNOSIS — I95.9 HYPOTENSION, UNSPECIFIED HYPOTENSION TYPE: ICD-10-CM

## 2021-02-22 DIAGNOSIS — U07.1 COVID-19 VIRUS INFECTION: ICD-10-CM

## 2021-02-22 DIAGNOSIS — R55 SYNCOPE AND COLLAPSE: Primary | ICD-10-CM

## 2021-02-22 DIAGNOSIS — R94.31 ABNORMAL EKG: ICD-10-CM

## 2021-02-22 DIAGNOSIS — R06.09 DOE (DYSPNEA ON EXERTION): ICD-10-CM

## 2021-02-22 PROCEDURE — 93000 ELECTROCARDIOGRAM COMPLETE: CPT | Performed by: SPECIALIST

## 2021-02-22 PROCEDURE — 99244 OFF/OP CNSLTJ NEW/EST MOD 40: CPT | Performed by: SPECIALIST

## 2021-02-22 RX ORDER — MIDODRINE HYDROCHLORIDE 5 MG/1
5 TABLET ORAL
Qty: 90 TAB | Refills: 3 | Status: SHIPPED | OUTPATIENT
Start: 2021-02-22 | End: 2021-03-09

## 2021-02-22 NOTE — PROGRESS NOTES
Visit Vitals  BP (!) 98/50 (BP 1 Location: Left upper arm, BP Patient Position: Sitting, BP Cuff Size: Adult)   Pulse 89   Resp 18   Ht 5' 8\" (1.727 m)   Wt 115 lb 6.4 oz (52.3 kg)   SpO2 97%   BMI 17.55 kg/m²

## 2021-02-22 NOTE — PROGRESS NOTES
Demi Hernández     1970       Jennifer Castañeda MD, [facility]  Date of Visit-2/22/2021   PCP is Teodora Moore MD   [facility] [place]  [facility]  HPI:  Demi Hernández is a 52 y.o. female   Referral from Teodora Moore MD for hypotension. Pt notes she started to experience low BP and fatigue about 6 weeks ago. She was started on Florinef but has not felt any better. She went to the [facility] over the weekend due to syncope, fatigue, vomiting, and diarrhea. She states her syncopal episode only lasted a couple seconds. Pt notes that she has noticed chest discomfort and wheezing when walking. Pt states that when she walks it is sometimes painful to breathe. She reports having swelling in her left leg when she eats a lot of salt. She was diagnosed with Stage 4 chronic kidney disease last year. She is going to have VV with nephrology on Thursday. Her GFR was 14 on Sunday. She had COVID in January. Her father had a heart attack in 2019 and a valve replacement in October. Denies shortness of breath at rest, has no tachycardia, palpitations or sense of arrhythmia. EKG: Sinus  Rhythm rate 89   QRS 90 ms  -Right atrial enlargement. -ST depression   +   Nonspecific T-abnormality  -Nondiagnostic. Assessment/Plan:     Patient Instructions   Please stop your Florinef. Start Midodrine 5mg twice a day for one week. After a week increase to three times a day with meals. You have been scheduled for an echocardiogram and a virtual visit to discuss results. You have been scheduled for a lexiscan nuclear stress test.     Lexiscan:    Wear comfortable clothing (shorts or pants with a shirt or blouse- no underwire bras) and walking or athletic shoes. Do not eat or drink anything, except water, for at least 2 hours prior to your appointment.  Avoid tobacco products for at least 6 hours prior to your test.    Do not eat or drink anything containing caffeine, including but not limited to the following: chocolate, regular and decaffeinated coffee, soft drinks, or tea for at least 12-24 hours prior to your test.    Do not hold your scheduled medications prior to your test.     You will need to inform our office if you are pregnant, nursing, or think you may be pregnant. Your test will be performed on a 1 day protocol. This is determined by your height, weight, and other risk factors. For a 1 day test, please allow for 4 hours in the office that day. Future Appointments   Date Time Provider Storm Perez   2/23/2021  3:00 PM ALEXANDER BAXTER AMB   3/2/2021 11:00 AM Jennifer Hernandez MD CAVREY BS AMB   3/22/2021  9:00 AM ALEXANDER MALDONADO BS AMB       1. Syncope and collapse  Likely due to orthostasis with acute gastroenteritis. She has been symptomatic for a month despite Florinef. Workup will continue with echo, stress test. We will start Midodrine to replace the Florinef. - AMB POC EKG ROUTINE W/ 12 LEADS, INTER & REP    2. Hypotension, unspecified hypotension type  This predates the syncope. I suspect associated with her kidney disease. Midodrine will probably be more tolerable. 3. CROCKETT (dyspnea on exertion)  I am concerned for possible cardiomyopathy. She seemed far more fatigued than I would suspect in addition to having pleuritic chest pain. I don't think she can tolerate a stress test walking. We will do a lexiscan but wait until her BP is stabilized. 4. CKD (chronic kidney disease) stage 4, GFR 15-29 ml/min ([facility])  Significant acceleration in the deterioration of her renal function. Blood work with renal has been ordered. We will confer with Dr. Bucky Rooney. 5. Family history of coronary arteriosclerosis  Present in father. 6. Abnormal EKG  Notable large P wave in inferior leads with possible JAH. Additionally has ST segment changes. Echo ASAP.      7. COVID-19 virus infection  Symptoms predated the viral infection. 8. Pleuritic chest pain  Stress test     F/u in 2 weeks with VV     Impression:   1. Syncope and collapse    2. Hypotension, unspecified hypotension type    3. CROCKETT (dyspnea on exertion)    4. CKD (chronic kidney disease) stage 4, GFR 15-29 ml/min ([facility])    5. Family history of coronary arteriosclerosis    6. Abnormal EKG    7. COVID-19 virus infection    8. Pleuritic chest pain       Cardiac History:   No specialty comments available. Allergies NKDA    Medical history pregnancy 2004 2002 medical history as above    Social history,: Non-smoker no alcohol drinks 1 cup of caffeine daily 3 children formerly at cross-country  and     Family history: Mother 68 with late stage myelofibrosis, father 66 with CAD [facility] 2019 atrial fibrillation valve replacement pacemaker 1 brother 46 in good health    Review of systems positive for chest pain shortness of breath fast heartbeat dizziness passing out needing glasses seeing double wheezing, blood in the urine, nausea, diarrhea, weight loss, menstrual difficulties, LMP October 2020, chronic itching, back pain, osteoarthritis, numbness, difficulty sleeping, difficulty memory, depression and anxiety  ROS-except as noted above. . A complete cardiac and respiratory are reviewed and negative except as above ; Resp-denies wheezing  or productive cough,. Const- No unusual weight loss or fever; Neuro-no recent seizure or CVA ; GI- No BRBPR, abdom pain, bloating ; - no  hematuria   see supplement sheet, initialed and to be scanned by staff  Past Medical History:   Diagnosis Date    Chronic kidney disease     right kidney function 8%, 1/4 size    Depression     Hypertension     Seizures ([facility] [address]       Social Hx= reports that she quit smoking about 25 years ago. Her smoking use included cigarettes. She has never used smokeless tobacco. She reports previous alcohol use of about 20.8 standard drinks of alcohol per week.  She reports that she does not use drugs. Exam and Labs:  BP (!) 98/50 (BP 1 Location: Left upper arm, BP Patient Position: Sitting, BP Cuff Size: Adult)   Pulse 89   Resp 18   Ht 5' 8\" (1.727 m)   Wt 115 lb 6.4 oz (52.3 kg)   SpO2 97%   BMI 17.55 kg/m² Constitutional:  NAD, comfortable  Head: NC,AT. Eyes: No scleral icterus. Neck:  Neck supple. No JVD present. Throat: moist mucous membranes. Chest: Effort normal & normal respiratory excursion . Neurological: alert, conversant and oriented . Skin: Skin is not cold. No obvious systemic rash noted. Not diaphoretic. No erythema. Psychiatric:  Grossly normal mood and affect. Behavior appears normal. Extremities:  no clubbing or cyanosis. Abdomen: non distended    Lungs:breath sounds normal. No stridor. distress, wheezes or  Rales. Heart:mild tachycardia, short 1/6 systolic murmur possible regular rhythm, normal S1, S2, no rubs, clicks or gallops , PMI non displaced. Edema: Edema is none.   No results found for: CHOL, CHOLX, CHLST, CHOLV, HDL, HDLP, LDL, LDLC, DLDLP, TGLX, TRIGL, TRIGP, CHHD, CHHDX  Lab Results   Component Value Date/Time    Sodium 134 (L) 02/14/2021 02:57 PM    Potassium 2.9 (L) 02/14/2021 02:57 PM    Chloride 96 (L) 02/14/2021 02:57 PM    CO2 21 02/14/2021 02:57 PM    Anion gap 17 (H) 02/14/2021 02:57 PM    Glucose 99 02/14/2021 02:57 PM    BUN 41 (H) 02/14/2021 02:57 PM    Creatinine 3.53 (H) 02/14/2021 02:57 PM    BUN/Creatinine ratio 12 02/14/2021 02:57 PM    GFR est AA 17 (L) 02/14/2021 02:57 PM    GFR est non-AA 14 (L) 02/14/2021 02:57 PM    Calcium 9.1 02/14/2021 02:57 PM      Wt Readings from Last 3 Encounters:   02/22/21 115 lb 6.4 oz (52.3 kg)   02/14/21 116 lb 6.5 oz (52.8 kg)   12/12/17 121 lb (54.9 kg)      BP Readings from Last 3 Encounters:   02/22/21 (!) 98/50   02/14/21 108/82   01/11/18 126/70      Current Outpatient Medications   Medication Sig    fludrocortisone (FLORINEF) 0.1 mg tablet TAKE 1 TABLET BY MOUTH EVERY DAY    busPIRone (BUSPAR) 10 mg tablet TAKE 1 TABLET BY MOUTH TWICE A DAY    albuterol (PROVENTIL HFA, VENTOLIN HFA, PROAIR HFA) 90 mcg/actuation inhaler INHALE 1 PUFF BY MOUTH EVERY 4 HOURS AS NEEDED    venlafaxine-SR (EFFEXOR-XR) 150 mg capsule     pregabalin (LYRICA) 50 mg capsule Take 50 mg by mouth two (2) times a day.  amitriptyline (ELAVIL) 10 mg tablet Take  by mouth nightly.  gabapentin (NEURONTIN) 300 mg capsule Take 2 Caps by mouth three (3) times daily. No current facility-administered medications for this visit. Impression see above.       Written by Jean-Pierre Carcamo, as dictated by Abhi Gabriel MD.

## 2021-02-22 NOTE — PATIENT INSTRUCTIONS
Please stop your Florinef. Start Midodrine 5mg twice a day for one week. After a week increase to three times a day with meals. You have been scheduled for an echocardiogram and a virtual visit to discuss results. You have been scheduled for a lexiscan nuclear stress test.     Lexiscan:    Wear comfortable clothing (shorts or pants with a shirt or blouse- no underwire bras) and walking or athletic shoes. Do not eat or drink anything, except water, for at least 2 hours prior to your appointment. Avoid tobacco products for at least 6 hours prior to your test.    Do not eat or drink anything containing caffeine, including but not limited to the following: chocolate, regular and decaffeinated coffee, soft drinks, or tea for at least 12-24 hours prior to your test.    Do not hold your scheduled medications prior to your test.     You will need to inform our office if you are pregnant, nursing, or think you may be pregnant. Your test will be performed on a 1 day protocol. This is determined by your height, weight, and other risk factors. For a 1 day test, please allow for 4 hours in the office that day.

## 2021-02-22 NOTE — Clinical Note
2/22/2021 Patient: Florencio Gold YOB: 1970 Date of Visit: 2/22/2021 Avelino Frederick MD 
[address] [place] [phone] Via Fax: [phone] Dear Avelino Frederick MD, Thank you for referring Ms. Zara Hong to [facility] for evaluation. My notes for this consultation are attached. If you have questions, please do not hesitate to call me. I look forward to following your patient along with you.  
 
 
Sincerely, 
 
Abhi Gabriel MD

## 2021-02-23 ENCOUNTER — ANCILLARY PROCEDURE (OUTPATIENT)
Dept: CARDIOLOGY CLINIC | Age: 51
End: 2021-02-23
Payer: COMMERCIAL

## 2021-02-23 VITALS — HEIGHT: 68 IN | BODY MASS INDEX: 17.43 KG/M2 | WEIGHT: 115 LBS

## 2021-02-23 DIAGNOSIS — R06.09 DOE (DYSPNEA ON EXERTION): ICD-10-CM

## 2021-02-23 DIAGNOSIS — R07.81 PLEURITIC CHEST PAIN: ICD-10-CM

## 2021-02-23 DIAGNOSIS — I95.9 HYPOTENSION, UNSPECIFIED HYPOTENSION TYPE: ICD-10-CM

## 2021-02-23 DIAGNOSIS — R55 SYNCOPE AND COLLAPSE: ICD-10-CM

## 2021-02-23 DIAGNOSIS — R94.31 ABNORMAL EKG: ICD-10-CM

## 2021-02-23 PROCEDURE — 93306 TTE W/DOPPLER COMPLETE: CPT | Performed by: SPECIALIST

## 2021-02-25 LAB
ECHO AO ROOT DIAM: 2.8 CM
ECHO AV AREA PEAK VELOCITY: 1.94 CM2
ECHO AV AREA VTI: 1.64 CM2
ECHO AV AREA/BSA PEAK VELOCITY: 1.2 CM2/M2
ECHO AV AREA/BSA VTI: 1 CM2/M2
ECHO AV MEAN GRADIENT: 2.43 MMHG
ECHO AV PEAK GRADIENT: 3.93 MMHG
ECHO AV PEAK VELOCITY: 99.13 CM/S
ECHO AV VTI: 15.99 CM
ECHO IVC PROX: 1.38 CM
ECHO LA AREA 4C: 10.53 CM2
ECHO LA MAJOR AXIS: 2.13 CM
ECHO LA MINOR AXIS: 1.32 CM
ECHO LA VOL 4C: 17.91 ML (ref 22–52)
ECHO LA VOLUME INDEX A4C: 11.09 ML/M2 (ref 16–28)
ECHO LV E' LATERAL VELOCITY: 8.45 CM/S
ECHO LV E' SEPTAL VELOCITY: 7.18 CM/S
ECHO LV EDV A2C: 48.52 ML
ECHO LV EDV A4C: 45.93 ML
ECHO LV EDV BP: 47.53 ML (ref 56–104)
ECHO LV EDV INDEX A4C: 28.4 ML/M2
ECHO LV EDV INDEX BP: 29.4 ML/M2
ECHO LV EDV NDEX A2C: 30 ML/M2
ECHO LV EJECTION FRACTION A2C: 45 PERCENT
ECHO LV EJECTION FRACTION A4C: 40 PERCENT
ECHO LV EJECTION FRACTION BIPLANE: 39.4 PERCENT (ref 55–100)
ECHO LV ESV A2C: 26.66 ML
ECHO LV ESV A4C: 27.73 ML
ECHO LV ESV BP: 28.83 ML (ref 19–49)
ECHO LV ESV INDEX A2C: 16.5 ML/M2
ECHO LV ESV INDEX A4C: 17.2 ML/M2
ECHO LV ESV INDEX BP: 17.8 ML/M2
ECHO LV GLOBAL LONGITUDINAL STRAIN (GLS): -16.5 PERCENT
ECHO LV INTERNAL DIMENSION DIASTOLIC: 3.31 CM (ref 3.9–5.3)
ECHO LV INTERNAL DIMENSION SYSTOLIC: 2.53 CM
ECHO LV IVSD: 0.71 CM (ref 0.6–0.9)
ECHO LV MASS 2D: 72 G (ref 67–162)
ECHO LV MASS INDEX 2D: 44.5 G/M2 (ref 43–95)
ECHO LV POSTERIOR WALL DIASTOLIC: 0.94 CM (ref 0.6–0.9)
ECHO LVOT DIAM: 1.92 CM
ECHO LVOT PEAK GRADIENT: 1.77 MMHG
ECHO LVOT PEAK VELOCITY: 66.59 CM/S
ECHO LVOT SV: 26.2 ML
ECHO LVOT VTI: 9.05 CM
ECHO MV A VELOCITY: 89.66 CM/S
ECHO MV E DECELERATION TIME (DT): 201.59 MS
ECHO MV E VELOCITY: 74.29 CM/S
ECHO MV E/A RATIO: 0.83
ECHO MV E/E' LATERAL: 8.79
ECHO MV E/E' RATIO (AVERAGED): 9.57
ECHO MV E/E' SEPTAL: 10.35
ECHO PV MAX VELOCITY: 78.64 CM/S
ECHO PV PEAK INSTANTANEOUS GRADIENT SYSTOLIC: 2.47 MMHG
ECHO RA MINOR AXIS: 2.7 CM
ECHO RV INTERNAL DIMENSION: 2.34 CM
ECHO RV TAPSE: 1.64 CM (ref 1.5–2)
ECHO TV REGURGITANT MAX VELOCITY: 230.06 CM/S
ECHO TV REGURGITANT PEAK GRADIENT: 21.17 MMHG
GLOBAL LONGITUDINAL STRAIN 2 CHAMBER: -17.7 PERCENT
GLOBAL LONGITUDINAL STRAIN 4 CHAMBER: -15.1 PERCENT
GLOBAL LONGITUDINAL STRAIN LONG AXIS: -16.5 PERCENT

## 2021-03-02 ENCOUNTER — VIRTUAL VISIT (OUTPATIENT)
Dept: CARDIOLOGY CLINIC | Age: 51
End: 2021-03-02
Payer: COMMERCIAL

## 2021-03-02 DIAGNOSIS — N18.4 CKD (CHRONIC KIDNEY DISEASE) STAGE 4, GFR 15-29 ML/MIN (HCC): ICD-10-CM

## 2021-03-02 DIAGNOSIS — I95.9 HYPOTENSION, UNSPECIFIED HYPOTENSION TYPE: Primary | ICD-10-CM

## 2021-03-02 DIAGNOSIS — I51.9 LEFT VENTRICULAR DYSFUNCTION: ICD-10-CM

## 2021-03-02 DIAGNOSIS — I34.1 MVP (MITRAL VALVE PROLAPSE): ICD-10-CM

## 2021-03-02 DIAGNOSIS — R06.09 DOE (DYSPNEA ON EXERTION): ICD-10-CM

## 2021-03-02 PROCEDURE — 99214 OFFICE O/P EST MOD 30 MIN: CPT | Performed by: SPECIALIST

## 2021-03-02 NOTE — PROGRESS NOTES
Future Appointments   Date Time Provider Storm Ana   3/2/2021 11:00 AM MD DEBI Maldonado BS AMB   3/22/2021  9:00 AM ALEXANDER MALDONADO BS AMB     Haydee JESIKA Calderon     1970       Jennifer Pope MD, [facility]  Date of Visit-3/2/2021   PCP is Srinivasan Naylor MD   [facility] [place]  [facility]  Virtual Visit  HPI:  Hector Mae is a 52 y.o. female   who was seen by synchronous (real-time) audio-video technology on 3/2/2021. Last office visit 2/22/2021  Fu of  Relative hypotension with syncope, orthostasis with acute gastroenteritis  Started on midodrine 2-22-21 (Florinef earlier)   Echo with mild reduction in EF-has CROCKETT,fatigue  Fu note from Renal is now reviewed today  Stage 4 CKD  Covid Jan 2021  Father with MI and valve replacement      Today  Still having low bp   Starting stomach issues with diarrhea and dehydration  Using Pedialyte  Still having CROCKETT which was present before the Covid  blood pressure average 70/50 with her home cuff  Dizzy on any standing, more lightehead than dizzy  Just on bid for now  Note from renal 2/25/2021 by nurse practitioner Franki Messer for Dr. Danica Flower is reviewed creatinine is 2.36 noted a history of stopping alcoholism and loss of 50 pounds and noting hypertension and hypotension hypotensive tensive now with continued shortness of breath and nausea. Blood work indicates a hemoglobin of 14.8 creatinine 2.36 and the indications that the renal disorder is due to reflux and obstructive uropathy with previous UTIs  Previous hx or visit:      No specialty comments available. Assessment/Plan:         1. Hypotension, unspecified hypotension type  *Today go up to midodrine 5mg TID, then in 5 days if not better go up to 7.5 mg TID and see me VV in one week    2.  CROCKETT (dyspnea on exertion)  -improved, more weakness overall  Echo Cardiogram shows low normal LV function, she does not appear to be however in CHF  02/23/21 ECHO ADULT COMPLETE 02/25/2021 2/25/2021    Narrative · LV: Calculated LVEF is 50%. Visually measured ejection fraction. Normal   cavity size, wall thickness and systolic function (ejection fraction   normal). Abnormal left ventricular strain. Global longitudinal strain is   -16.5%. Mild (grade 1) left ventricular diastolic dysfunction. · MV: Mild mitral valve prolapse of the posterior leaflet. Poor paraternal   window; best seen in Apical imaging Mild mitral valve regurgitation is   present. · Image quality for this study was technically difficult. Hard to assess   EF. Signed by: Sangeeta Dowell MD        3. Left ventricular dysfunction  + family hx of CAD, no angina, Lexiscan once bp stable    4. MVP (mitral valve prolapse)  --noted on echo , mild and only mild MR    5. CKD (chronic kidney disease) stage 4, GFR 15-29 ml/min ([facility])  Due to prior infection and renal dz, notes from Renal reviewed       This note was created using voice recognition software. Despite editing, there may be syntax errors. Key CAD CHF Meds     Patient is on no cardiovascular meds. ROS-except as noted above. . A complete cardiac and respiratory are reviewed and negative except as above ; Resp-denies wheezing  or productive cough,. Const- No unusual weight loss or fever; Neuro-no recent seizure or CVA ; GI- No BRBPR, abdom pain, bloating ; - no  hematuria   Past Medical History:   Diagnosis Date    Chronic kidney disease     right kidney function 8%, 1/4 size    Depression     Hypertension     Seizures ([facility] [address]       Social Hx= reports that she quit smoking about 25 years ago. Her smoking use included cigarettes. She has never used smokeless tobacco. She reports previous alcohol use of about 20.8 standard drinks of alcohol per week. She reports that she does not use drugs. Due to this being a TeleHealth evaluation, many elements of the physical examination are unable to be assessed.    Vitals if sent, or see HPI  There were no vitals taken for this visit. General: Well developed, in no acute distress, cooperative and alert  HEENT: Pupils equal/round. No marked JVD visible on video. Respiratory: No audible wheezing, no signs of respiratory distress, lips non cyanotic  Extremities:  No edema  Neuro: A&Ox3, speech clear, no facial droop, answering questions appropriately  Skin: Skin color is normal. No rashes or lesions. Non diaphoretic on visible skin during exam  Psych: mood and affect are appropriate and pleasant    No results found for: CHOL, CHOLX, CHLST, CHOLV, HDL, HDLP, LDL, LDLC, DLDLP, TGLX, TRIGL, TRIGP, CHHD, CHHDX  Lab Results   Component Value Date/Time    Sodium 134 (L) 02/14/2021 02:57 PM    Potassium 2.9 (L) 02/14/2021 02:57 PM    Chloride 96 (L) 02/14/2021 02:57 PM    CO2 21 02/14/2021 02:57 PM    Anion gap 17 (H) 02/14/2021 02:57 PM    Glucose 99 02/14/2021 02:57 PM    BUN 41 (H) 02/14/2021 02:57 PM    Creatinine 3.53 (H) 02/14/2021 02:57 PM    BUN/Creatinine ratio 12 02/14/2021 02:57 PM    GFR est AA 17 (L) 02/14/2021 02:57 PM    GFR est non-AA 14 (L) 02/14/2021 02:57 PM    Calcium 9.1 02/14/2021 02:57 PM      Wt Readings from Last 3 Encounters:   02/23/21 115 lb (52.2 kg)   02/22/21 115 lb 6.4 oz (52.3 kg)   02/14/21 116 lb 6.5 oz (52.8 kg)      BP Readings from Last 3 Encounters:   02/22/21 (!) 98/50   02/14/21 108/82   01/11/18 126/70        Current Outpatient Medications   Medication Sig    midodrine (PROAMATINE) 5 mg tablet Take 1 Tab by mouth three (3) times daily (with meals).  busPIRone (BUSPAR) 10 mg tablet TAKE 1 TABLET BY MOUTH TWICE A DAY    albuterol (PROVENTIL HFA, VENTOLIN HFA, PROAIR HFA) 90 mcg/actuation inhaler INHALE 1 PUFF BY MOUTH EVERY 4 HOURS AS NEEDED    venlafaxine-SR (EFFEXOR-XR) 150 mg capsule     pregabalin (LYRICA) 50 mg capsule Take 50 mg by mouth two (2) times a day.  amitriptyline (ELAVIL) 10 mg tablet Take  by mouth nightly.     gabapentin (NEURONTIN) 300 mg capsule Take 2 Caps by mouth three (3) times daily. No current facility-administered medications for this visit. Impression see above. VIRTUAL VISIT DOCUMENTATION   Pursuant to the emergency declaration under the [facility] waiver authority and the [employer] and [employer] Act, this Virtual  Visit was conducted, with patient's consent, to reduce the patient's risk of exposure to COVID-19 and provide continuity of care for an established patient. Services were provided through a video synchronous discussion virtually to substitute for in-person clinic visit. We discussed the expected course, resolution and complications of the diagnosis(es) in detail. Medication risks, benefits, costs, interactions, and alternatives were discussed as indicated. I advised her to contact the office if her condition worsens, changes or fails to improve as anticipated. She expressed understanding with the diagnosis(es) and plan  I have reviewed the nurses notes, vitals, problem list, allergy list, medical history, family, social history and medications. FOLLOW-UP   Patient was made aware and verbalized understanding that an appointment will be scheduled for them for a virtual visit and/or office visit within the above time frame. Patient understanding his/her responsibility to call and change time/date if he/she so chooses. Jessica Voss MD    [facility].  [address] [place] [address], [address]  [phone] / [phone] Fax  [phone] / [phone] Fax  This visit was conducted using [employer] telemedicine services or similar service.

## 2021-03-03 NOTE — PATIENT INSTRUCTIONS
Please increase your Midodrine to 5mg three times a day today. If in five days you are not better increase to 7.5mg three times a day. We will see you back in one week with another virtual visit.

## 2021-03-09 ENCOUNTER — VIRTUAL VISIT (OUTPATIENT)
Dept: CARDIOLOGY CLINIC | Age: 51
End: 2021-03-09
Payer: COMMERCIAL

## 2021-03-09 DIAGNOSIS — R11.10 VOMITING AND DIARRHEA: ICD-10-CM

## 2021-03-09 DIAGNOSIS — I95.9 HYPOTENSION, UNSPECIFIED HYPOTENSION TYPE: Primary | ICD-10-CM

## 2021-03-09 DIAGNOSIS — N18.4 CKD (CHRONIC KIDNEY DISEASE) STAGE 4, GFR 15-29 ML/MIN (HCC): ICD-10-CM

## 2021-03-09 DIAGNOSIS — R19.7 VOMITING AND DIARRHEA: ICD-10-CM

## 2021-03-09 DIAGNOSIS — I34.1 MVP (MITRAL VALVE PROLAPSE): ICD-10-CM

## 2021-03-09 DIAGNOSIS — I51.9 LEFT VENTRICULAR DYSFUNCTION: ICD-10-CM

## 2021-03-09 PROCEDURE — 99214 OFFICE O/P EST MOD 30 MIN: CPT | Performed by: SPECIALIST

## 2021-03-09 RX ORDER — ONDANSETRON 8 MG/1
TABLET, ORALLY DISINTEGRATING ORAL
COMMUNITY
Start: 2021-03-04

## 2021-03-09 RX ORDER — MIDODRINE HYDROCHLORIDE 10 MG/1
10 TABLET ORAL
Qty: 90 TAB | Refills: 2 | Status: SHIPPED | OUTPATIENT
Start: 2021-03-09 | End: 2021-12-15 | Stop reason: SDUPTHER

## 2021-03-09 NOTE — PROGRESS NOTES
Future Appointments   Date Time Provider Storm Ana   3/9/2021  2:00 PM MD DEBI Zhu BS AMB   3/22/2021  9:00 AM ALEXANDER MALDONADO BS AMB     Haydee Macein     1970       Jennifer Riley MD, [facility]  Date of Visit-3/9/2021   PCP is Rufus Jonas MD   [facility] [place]  [facility]  Virtual Visit  HPI:  Karen Castro is a 52 y.o. female   who was seen by synchronous (real-time) audio-video technology on 3/9/2021. Fu of  Relative hypotension with syncope, orthostasis with acute gastroenteritis  Started on midodrine 2-22-21 (Florinef earlier)   Echo with mild reduction in EF-has CROCKETT,fatigue  Fu note from Renal is now reviewed today  Stage 4 CKD  Covid Jan 2021  Father with MI and valve replacement      Today  Still very lightheaded, having diarhea and dry  Trying to stay hydrated  Not seen GI   blood pressure running lower in am 71/60 128 in morning then gets better 83/62 110  Does get some 94/74 in later in day and pulse 111  Still having low bp   Starting stomach issues with diarrhea and dehydration  Using Pedialyte  Still having CROCKETT which was present before the Covid  Little chest pain  Note from renal 2/25/2021 by nurse practitioner Claudell Alexandria for Dr. Abdirizak Brown is reviewed creatinine is 2.36 noted a history of stopping alcoholism and loss of 50 pounds and noting hypertension and hypotension hypotensive tensive now with continued shortness of breath and nausea. Blood work indicates a hemoglobin of 14.8 creatinine 2.36 and the indications that the renal disorder is due to reflux and obstructive uropathy with previous UTIs        Previous hx or visit:      No specialty comments available. Assessment/Plan:     Increase midodrine to 10 mg TID   See GI about diarrhea      1. Hypotension, unspecified hypotension type  1 week ago we added midodrine and will increase that to 10 mg 3 times daily.     2. CROCKETT (dyspnea on exertion)  Left ventricular dysfunction  + family hx of CAD, no angina, Lexiscan once bp stable    -improved, more weakness overall  Echo Cardiogram shows low normal LV function, she does not appear to be however in CHF  02/23/21   ECHO ADULT COMPLETE 02/25/2021 2/25/2021    Narrative · LV: Calculated LVEF is 50%. Visually measured ejection fraction. Normal   cavity size, wall thickness and systolic function (ejection fraction   normal). Abnormal left ventricular strain. Global longitudinal strain is   -16.5%. Mild (grade 1) left ventricular diastolic dysfunction. · MV: Mild mitral valve prolapse of the posterior leaflet. Poor paraternal   window; best seen in Apical imaging Mild mitral valve regurgitation is   present. · Image quality for this study was technically difficult. Hard to assess   EF. Signed by: Irene Mariscal MD        3. Diarrhea, leading to volume depletion , not helping our BP, was happening before the midodrine  See GI  4. MVP (mitral valve prolapse)  *noted on echo , mild and only mild MR    5. CKD (chronic kidney disease) stage 4, GFR 15-29 ml/min (HCC)  **Due to prior infection and renal dz    1. Hypotension, unspecified hypotension type    2. Left ventricular dysfunction    3. MVP (mitral valve prolapse)    4. CKD (chronic kidney disease) stage 4, GFR 15-29 ml/min (HCC)    5. Vomiting and diarrhea        This note was created using voice recognition software. Despite editing, there may be syntax errors. Key CAD CHF Meds     Patient is on no cardiovascular meds. ROS-except as noted above. . A complete cardiac and respiratory are reviewed and negative except as above ; Resp-denies wheezing  or productive cough,.  Const- No unusual weight loss or fever; Neuro-no recent seizure or CVA ; GI- No BRBPR, abdom pain, bloating ; - no  hematuria   Past Medical History:   Diagnosis Date    Chronic kidney disease     right kidney function 8%, 1/4 size    Depression     Hypertension     Seizures ([address]       Social Hx= reports that she quit smoking about 25 years ago. Her smoking use included cigarettes. She has never used smokeless tobacco. She reports previous alcohol use of about 20.8 standard drinks of alcohol per week. She reports that she does not use drugs. Due to this being a TeleHealth evaluation, many elements of the physical examination are unable to be assessed. Vitals if sent, or see HPI  There were no vitals taken for this visit. General: Well developed, in no acute distress, cooperative and alert  HEENT: Pupils equal/round. No marked JVD visible on video. Respiratory: No audible wheezing, no signs of respiratory distress, lips non cyanotic  Extremities:  No edema  Neuro: A&Ox3, speech clear, no facial droop, answering questions appropriately  Skin: Skin color is normal. No rashes or lesions. Non diaphoretic on visible skin during exam  Psych: mood and affect are appropriate and pleasant    No results found for: CHOL, CHOLX, CHLST, CHOLV, HDL, HDLP, LDL, LDLC, DLDLP, TGLX, TRIGL, TRIGP, CHHD, CHHDX  Lab Results   Component Value Date/Time    Sodium 134 (L) 02/14/2021 02:57 PM    Potassium 2.9 (L) 02/14/2021 02:57 PM    Chloride 96 (L) 02/14/2021 02:57 PM    CO2 21 02/14/2021 02:57 PM    Anion gap 17 (H) 02/14/2021 02:57 PM    Glucose 99 02/14/2021 02:57 PM    BUN 41 (H) 02/14/2021 02:57 PM    Creatinine 3.53 (H) 02/14/2021 02:57 PM    BUN/Creatinine ratio 12 02/14/2021 02:57 PM    GFR est AA 17 (L) 02/14/2021 02:57 PM    GFR est non-AA 14 (L) 02/14/2021 02:57 PM    Calcium 9.1 02/14/2021 02:57 PM      Wt Readings from Last 3 Encounters:   02/23/21 115 lb (52.2 kg)   02/22/21 115 lb 6.4 oz (52.3 kg)   02/14/21 116 lb 6.5 oz (52.8 kg)      BP Readings from Last 3 Encounters:   02/22/21 (!) 98/50   02/14/21 108/82   01/11/18 126/70        Current Outpatient Medications   Medication Sig    midodrine (PROAMATINE) 5 mg tablet Take 1 Tab by mouth three (3) times daily (with meals).  busPIRone (BUSPAR) 10 mg tablet TAKE 1 TABLET BY MOUTH TWICE A DAY    albuterol (PROVENTIL HFA, VENTOLIN HFA, PROAIR HFA) 90 mcg/actuation inhaler INHALE 1 PUFF BY MOUTH EVERY 4 HOURS AS NEEDED    venlafaxine-SR (EFFEXOR-XR) 150 mg capsule     pregabalin (LYRICA) 50 mg capsule Take 50 mg by mouth two (2) times a day.  amitriptyline (ELAVIL) 10 mg tablet Take  by mouth nightly.  gabapentin (NEURONTIN) 300 mg capsule Take 2 Caps by mouth three (3) times daily.  ondansetron (ZOFRAN ODT) 8 mg disintegrating tablet DISSOLVE 1 TABLET ON THE TONGUE TWICE A DAY AS NEEDED     No current facility-administered medications for this visit. Impression see above. VIRTUAL VISIT DOCUMENTATION   Pursuant to the emergency declaration under the [address] [place] [address], [facility] waiver authority and the [employer] Act, this Virtual  Visit was conducted, with patient's consent, to reduce the patient's risk of exposure to COVID-19 and provide continuity of care for an established patient. Services were provided through a video synchronous discussion virtually to substitute for in-person clinic visit. We discussed the expected course, resolution and complications of the diagnosis(es) in detail. Medication risks, benefits, costs, interactions, and alternatives were discussed as indicated. I advised her to contact the office if her condition worsens, changes or fails to improve as anticipated. She expressed understanding with the diagnosis(es) and plan  I have reviewed the nurses notes, vitals, problem list, allergy list, medical history, family, social history and medications. FOLLOW-UP   Patient was made aware and verbalized understanding that an appointment will be scheduled for them for a virtual visit and/or office visit within the above time frame.  Patient understanding his/her responsibility to call and change time/date if he/she so chooses. Jacinto Julian MD    [address] [place] [address], [address]    [place], [facility]  [phone] / [phone] Fax  [phone] / [phone] Fax  This visit was conducted using [employer] telemedicine services or similar service.

## 2021-03-23 ENCOUNTER — VIRTUAL VISIT (OUTPATIENT)
Dept: CARDIOLOGY CLINIC | Age: 51
End: 2021-03-23
Payer: COMMERCIAL

## 2021-03-23 DIAGNOSIS — I34.0 NON-RHEUMATIC MITRAL REGURGITATION: ICD-10-CM

## 2021-03-23 DIAGNOSIS — R11.10 VOMITING AND DIARRHEA: ICD-10-CM

## 2021-03-23 DIAGNOSIS — I51.9 LEFT VENTRICULAR DYSFUNCTION: ICD-10-CM

## 2021-03-23 DIAGNOSIS — I34.1 MVP (MITRAL VALVE PROLAPSE): ICD-10-CM

## 2021-03-23 DIAGNOSIS — R19.7 VOMITING AND DIARRHEA: ICD-10-CM

## 2021-03-23 DIAGNOSIS — N18.4 CKD (CHRONIC KIDNEY DISEASE) STAGE 4, GFR 15-29 ML/MIN (HCC): ICD-10-CM

## 2021-03-23 DIAGNOSIS — I95.9 HYPOTENSION, UNSPECIFIED HYPOTENSION TYPE: Primary | ICD-10-CM

## 2021-03-23 PROCEDURE — 99213 OFFICE O/P EST LOW 20 MIN: CPT | Performed by: SPECIALIST

## 2021-03-23 RX ORDER — PANTOPRAZOLE SODIUM 40 MG/1
TABLET, DELAYED RELEASE ORAL
Status: ON HOLD | COMMUNITY
Start: 2021-03-15 | End: 2021-11-23

## 2021-03-23 NOTE — PROGRESS NOTES
Jorge Martinez     1970       Jennifer Ritter MD, [facility]  Date of Visit-3/23/2021   PCP is Ayana Nguyen MD   [facility] [place]  [facility]  Virtual Visit  HPI:  Jorge Martinez is a 52 y.o. female   who was seen by synchronous (real-time) audio-video technology on 3/23/2021. Fu of  Relative hypotension with syncope, orthostasis with acute gastroenteritis  Started on midodrine 2-22-21 (Florinef earlier)   Echo with mild reduction in EF-has CROCKETT,fatigue  Fu note from Renal is now reviewed today  Stage 4 CKD  Covid Jan 2021  Father with MI and valve replacement    02/23/21   ECHO ADULT COMPLETE 02/25/2021 2/25/2021    Narrative · LV: Calculated LVEF is 50%. Visually measured ejection fraction. Normal   cavity size, wall thickness and systolic function (ejection fraction   normal). Abnormal left ventricular strain. Global longitudinal strain is   -16.5%. Mild (grade 1) left ventricular diastolic dysfunction. · MV: Mild mitral valve prolapse of the posterior leaflet. Poor paraternal   window; best seen in Apical imaging Mild mitral valve regurgitation is   present. · Image quality for this study was technically difficult. Hard to assess   EF. Signed by: Arnol Gaffney MD              Today  blood pressure 80s  92/73 74/ and 83-trending up  Pulse running 118, 128 119  Does not feel tachy often  SOB-less than  CP -occ pain  Edema-none  Renal seen and more blood work, autoimmune blood work was negative, GFR is 18 and stable  Does better with fluids , and craving salt  Previous hx or visit:  On midodrine 10mg TID  Walking on treadmill 2 x week , about 15 minutes at 2.8 mph    No specialty comments available. Assessment/Plan:     Future Appointments   Date Time Provider Storm Perez   7/13/2021  9:00 AM Jennifer Hernandez MD [facility] BS AMB        1.   Hypotension likely orthostasis  Persistent despite is in of diarrhea   added midodrine increased to 10 mg 3 times daily  Increased salt intake advised  Looks well, no edema  Key CAD CHF Meds     Patient is on no cardiovascular meds. 2 CROCKETT/LV dysfunction  Echo with low normal LV systolic function but no overt CHF    3. Mitral prolapse posterior leaflet that is mild mild MR  Went over that the MVP and MR are not likely to need the valve placement    4. CKD 4 due to prior infection and renal disease  Seeing renal in June, we will see in July as VV and then set up later TAMMY    This note was created using voice recognition software. Despite editing, there may be syntax errors. Key CAD CHF Meds     Patient is on no cardiovascular meds. 1. Hypotension, unspecified hypotension type    2. Left ventricular dysfunction    3. MVP (mitral valve prolapse)    4. Non-rheumatic mitral regurgitation    5. Vomiting and diarrhea    6. CKD (chronic kidney disease) stage 4, GFR 15-29 ml/min ([facility])         ROS-except as noted above. . A complete cardiac and respiratory are reviewed and negative except as above ; Resp-denies wheezing  or productive cough,. Const- No unusual weight loss or fever; Neuro-no recent seizure or CVA ; GI- No BRBPR, abdom pain, bloating ; - no  hematuria   Past Medical History:   Diagnosis Date    Chronic kidney disease     right kidney function 8%, 1/4 size    Depression     Hypertension     Seizures ([facility] [address]       Social Hx= reports that she quit smoking about 25 years ago. Her smoking use included cigarettes. She has never used smokeless tobacco. She reports previous alcohol use of about 20.8 standard drinks of alcohol per week. She reports that she does not use drugs. Due to this being a TeleHealth evaluation, many elements of the physical examination are unable to be assessed. Vitals if sent, or see HPI  There were no vitals taken for this visit. General: Well developed, in no acute distress, cooperative and alert  HEENT: Pupils equal/round.  No marked JVD visible on video.  Respiratory: No audible wheezing, no signs of respiratory distress, lips non cyanotic  Extremities:  No edema  Neuro: A&Ox3, speech clear, no facial droop, answering questions appropriately  Skin: Skin color is normal. No rashes or lesions. Non diaphoretic on visible skin during exam  Psych: mood and affect are appropriate and pleasant    No results found for: CHOL, CHOLX, CHLST, CHOLV, HDL, HDLP, LDL, LDLC, DLDLP, TGLX, TRIGL, TRIGP, CHHD, CHHDX  Lab Results   Component Value Date/Time    Sodium 134 (L) 02/14/2021 02:57 PM    Potassium 2.9 (L) 02/14/2021 02:57 PM    Chloride 96 (L) 02/14/2021 02:57 PM    CO2 21 02/14/2021 02:57 PM    Anion gap 17 (H) 02/14/2021 02:57 PM    Glucose 99 02/14/2021 02:57 PM    BUN 41 (H) 02/14/2021 02:57 PM    Creatinine 3.53 (H) 02/14/2021 02:57 PM    BUN/Creatinine ratio 12 02/14/2021 02:57 PM    GFR est AA 17 (L) 02/14/2021 02:57 PM    GFR est non-AA 14 (L) 02/14/2021 02:57 PM    Calcium 9.1 02/14/2021 02:57 PM      Wt Readings from Last 3 Encounters:   02/23/21 115 lb (52.2 kg)   02/22/21 115 lb 6.4 oz (52.3 kg)   02/14/21 116 lb 6.5 oz (52.8 kg)      BP Readings from Last 3 Encounters:   02/22/21 (!) 98/50   02/14/21 108/82   01/11/18 126/70        Current Outpatient Medications   Medication Sig    pantoprazole (PROTONIX) 40 mg tablet TAKE 1 TABLET BY MOUTH EVERY MORNING    ondansetron (ZOFRAN ODT) 8 mg disintegrating tablet DISSOLVE 1 TABLET ON THE TONGUE TWICE A DAY AS NEEDED    midodrine (PROAMATINE) 10 mg tablet Take 1 Tab by mouth three (3) times daily (with meals).  busPIRone (BUSPAR) 10 mg tablet TAKE 1 TABLET BY MOUTH TWICE A DAY    albuterol (PROVENTIL HFA, VENTOLIN HFA, PROAIR HFA) 90 mcg/actuation inhaler INHALE 1 PUFF BY MOUTH EVERY 4 HOURS AS NEEDED    venlafaxine-SR (EFFEXOR-XR) 150 mg capsule     pregabalin (LYRICA) 50 mg capsule Take 50 mg by mouth two (2) times a day.  amitriptyline (ELAVIL) 10 mg tablet Take  by mouth nightly.     gabapentin (NEURONTIN) 300 mg capsule Take 2 Caps by mouth three (3) times daily. No current facility-administered medications for this visit. Impression see above. VIRTUAL VISIT DOCUMENTATION   Pursuant to the emergency declaration under the [facility], [facility] waiver authority and the [employer] Act, this Virtual  Visit was conducted, with patient's consent, to reduce the patient's risk of exposure to COVID-19 and provide continuity of care for an established patient. Services were provided through a video synchronous discussion virtually to substitute for in-person clinic visit. We discussed the expected course, resolution and complications of the diagnosis(es) in detail. Medication risks, benefits, costs, interactions, and alternatives were discussed as indicated. I advised her to contact the office if her condition worsens, changes or fails to improve as anticipated. She expressed understanding with the diagnosis(es) and plan  I have reviewed the nurses notes, vitals, problem list, allergy list, medical history, family, social history and medications. FOLLOW-UP   Patient was made aware and verbalized understanding that an appointment will be scheduled for them for a virtual visit and/or office visit within the above time frame. Patient understanding his/her responsibility to call and change time/date if he/she so chooses. MD [address]  [address], Suite [facility], [address] [place] [address]  [phone] / [phone] Fax  [phone] / [phone] Fax  This visit was conducted using [employer] services or similar service.

## 2021-05-27 ENCOUNTER — HOSPITAL ENCOUNTER (EMERGENCY)
Age: 51
Discharge: HOME | End: 2021-05-27
Attending: EMERGENCY MEDICINE
Payer: COMMERCIAL

## 2021-05-27 VITALS
HEIGHT: 67 IN | OXYGEN SATURATION: 100 % | DIASTOLIC BLOOD PRESSURE: 82 MMHG | WEIGHT: 117.28 LBS | BODY MASS INDEX: 18.41 KG/M2 | RESPIRATION RATE: 15 BRPM | SYSTOLIC BLOOD PRESSURE: 102 MMHG | HEART RATE: 95 BPM | TEMPERATURE: 97.8 F

## 2021-05-27 DIAGNOSIS — R39.9 LOWER URINARY TRACT SYMPTOMS (LUTS): Primary | ICD-10-CM

## 2021-05-27 LAB
ALBUMIN SERPL-MCNC: 3.6 G/DL (ref 3.5–5)
ALBUMIN/GLOB SERPL: 1.1 {RATIO} (ref 1.1–2.2)
ALP SERPL-CCNC: 50 U/L (ref 45–117)
ALT SERPL-CCNC: 27 U/L (ref 12–78)
ANION GAP SERPL CALC-SCNC: 13 MMOL/L (ref 5–15)
APPEARANCE UR: CLEAR
AST SERPL-CCNC: 48 U/L (ref 15–37)
BACTERIA URNS QL MICRO: ABNORMAL /HPF
BASOPHILS # BLD: 0.1 K/UL (ref 0–0.1)
BASOPHILS NFR BLD: 1 % (ref 0–1)
BILIRUB SERPL-MCNC: 0.4 MG/DL (ref 0.2–1)
BILIRUB UR QL: NEGATIVE
BUN SERPL-MCNC: 38 MG/DL (ref 6–20)
BUN/CREAT SERPL: 11 (ref 12–20)
CALCIUM SERPL-MCNC: 8.7 MG/DL (ref 8.5–10.1)
CHLORIDE SERPL-SCNC: 96 MMOL/L (ref 97–108)
CO2 SERPL-SCNC: 25 MMOL/L (ref 21–32)
COLOR UR: ABNORMAL
COMMENT, HOLDF: NORMAL
CREAT SERPL-MCNC: 3.35 MG/DL (ref 0.55–1.02)
DIFFERENTIAL METHOD BLD: ABNORMAL
EOSINOPHIL # BLD: 0.2 K/UL (ref 0–0.4)
EOSINOPHIL NFR BLD: 2 % (ref 0–7)
EPITH CASTS URNS QL MICRO: ABNORMAL /LPF
ERYTHROCYTE [DISTWIDTH] IN BLOOD BY AUTOMATED COUNT: 15.6 % (ref 11.5–14.5)
GLOBULIN SER CALC-MCNC: 3.4 G/DL (ref 2–4)
GLUCOSE SERPL-MCNC: 85 MG/DL (ref 65–100)
GLUCOSE UR STRIP.AUTO-MCNC: NEGATIVE MG/DL
GRAN CASTS URNS QL MICRO: ABNORMAL /LPF
HCT VFR BLD AUTO: 37.2 % (ref 35–47)
HGB BLD-MCNC: 12.2 G/DL (ref 11.5–16)
HGB UR QL STRIP: NEGATIVE
HYALINE CASTS URNS QL MICRO: ABNORMAL /LPF (ref 0–5)
IMM GRANULOCYTES # BLD AUTO: 0.1 K/UL (ref 0–0.04)
IMM GRANULOCYTES NFR BLD AUTO: 1 % (ref 0–0.5)
KETONES UR QL STRIP.AUTO: NEGATIVE MG/DL
LEUKOCYTE ESTERASE UR QL STRIP.AUTO: NEGATIVE
LYMPHOCYTES # BLD: 1.9 K/UL (ref 0.8–3.5)
LYMPHOCYTES NFR BLD: 18 % (ref 12–49)
MCH RBC QN AUTO: 27.1 PG (ref 26–34)
MCHC RBC AUTO-ENTMCNC: 32.8 G/DL (ref 30–36.5)
MCV RBC AUTO: 82.7 FL (ref 80–99)
MONOCYTES # BLD: 0.7 K/UL (ref 0–1)
MONOCYTES NFR BLD: 6 % (ref 5–13)
NEUTS SEG # BLD: 7.5 K/UL (ref 1.8–8)
NEUTS SEG NFR BLD: 72 % (ref 32–75)
NITRITE UR QL STRIP.AUTO: NEGATIVE
NRBC # BLD: 0 K/UL (ref 0–0.01)
NRBC BLD-RTO: 0 PER 100 WBC
PH UR STRIP: 5.5 [PH] (ref 5–8)
PLATELET # BLD AUTO: 439 K/UL (ref 150–400)
PMV BLD AUTO: 11.9 FL (ref 8.9–12.9)
POTASSIUM SERPL-SCNC: 4.5 MMOL/L (ref 3.5–5.1)
PROT SERPL-MCNC: 7 G/DL (ref 6.4–8.2)
PROT UR STRIP-MCNC: ABNORMAL MG/DL
RBC # BLD AUTO: 4.5 M/UL (ref 3.8–5.2)
RBC #/AREA URNS HPF: ABNORMAL /HPF (ref 0–5)
SAMPLES BEING HELD,HOLD: NORMAL
SODIUM SERPL-SCNC: 134 MMOL/L (ref 136–145)
SP GR UR REFRACTOMETRY: 1.02 (ref 1–1.03)
UA: UC IF INDICATED,UAUC: ABNORMAL
UROBILINOGEN UR QL STRIP.AUTO: 0.2 EU/DL (ref 0.2–1)
WBC # BLD AUTO: 10.5 K/UL (ref 3.6–11)
WBC URNS QL MICRO: ABNORMAL /HPF (ref 0–4)

## 2021-05-27 PROCEDURE — 99285 EMERGENCY DEPT VISIT HI MDM: CPT

## 2021-05-27 PROCEDURE — 81001 URINALYSIS AUTO W/SCOPE: CPT

## 2021-05-27 PROCEDURE — 96361 HYDRATE IV INFUSION ADD-ON: CPT

## 2021-05-27 PROCEDURE — 80053 COMPREHEN METABOLIC PANEL: CPT

## 2021-05-27 PROCEDURE — 93005 ELECTROCARDIOGRAM TRACING: CPT

## 2021-05-27 PROCEDURE — 74011250636 HC RX REV CODE- 250/636: Performed by: EMERGENCY MEDICINE

## 2021-05-27 PROCEDURE — 96360 HYDRATION IV INFUSION INIT: CPT

## 2021-05-27 PROCEDURE — 85025 COMPLETE CBC W/AUTO DIFF WBC: CPT

## 2021-05-27 RX ADMIN — SODIUM CHLORIDE 1000 ML: 9 INJECTION, SOLUTION INTRAVENOUS at 14:07

## 2021-05-27 NOTE — ED PROVIDER NOTES
52 yoF hx of CKD Stage 4, presenting to ED for dehydration. Pt reports chronic N/V/D 2/2 to her CKD, and reports lately she has been unable to hydrate to the level she needs. She reports decreased to no urination. Ongoing diarrhea. She measured her BP today and got 82/60 before midodrine. +Fatigue/exhaustion. Sleeping 5 hrs during day in addition to normal sleep. Also experiencing hand cramping. Hx hypokalemia and out of K supplement. Craving sodium. Sees Dr. Rachael Hamilton with [facility] and Dr. Katie Zuñiga with with [facility].              Past Medical History:   Diagnosis Date    Chronic kidney disease     right kidney function 8%, 1/ size    Depression     Hypertension     Seizures (HCC)     Stage 4 chronic kidney disease (HCC)        Past Surgical History:   Procedure Laterality Date    HX GYN      4 pregnancies/ 3 children/1 miscarriage    CT BREAST SURGERY PROCEDURE UNLISTED      augmentation         Family History:   Problem Relation Age of Onset    Cancer Mother         myelofibrosis    Depression Father     Diabetes Maternal Grandfather     Cancer Paternal Grandmother         brain tumor and lung        Social History     Socioeconomic History    Marital status:      Spouse name: Not on file    Number of children: Not on file    Years of education: Not on file    Highest education level: Not on file   Occupational History    Not on file   Tobacco Use    Smoking status: Former Smoker     Types: Cigarettes     Quit date: 1996     Years since quittin.4    Smokeless tobacco: Never Used   Substance and Sexual Activity    Alcohol use: Not Currently     Alcohol/week: 20.8 standard drinks     Types: 25 Glasses of wine per week    Drug use: No    Sexual activity: Yes     Partners: Male   Other Topics Concern    Not on file   Social History Narrative    Not on file     Social Determinants of Health     Financial Resource Strain:     Difficulty of Paying Living Expenses:    Food Insecurity:     Worried About Running Out of Food in the Last Year:     [address] in the Last Year:    Transportation Needs:     Lack of Transportation (Medical):  Lack of Transportation (Non-Medical):    Physical Activity:     Days of Exercise per Week:     Minutes of Exercise per Session:    Stress:     Feeling of Stress :    Social Connections:     Frequency of Communication with Friends and Family:     Frequency of Social Gatherings with Friends and Family:     Attends Confucianist Services:     Active Member of Clubs or Organizations:     Attends Club or Organization Meetings:     Marital Status:    Intimate Partner Violence:     Fear of Current or Ex-Partner:     Emotionally Abused:     Physically Abused:     Sexually Abused: ALLERGIES: Patient has no known allergies. Review of Systems   Constitutional: Negative for chills and fever. HENT: Negative for congestion. Eyes: Negative for visual disturbance. Respiratory: Negative for shortness of breath. Cardiovascular: Negative for chest pain. Gastrointestinal: Positive for diarrhea. Negative for abdominal pain, nausea and vomiting. Genitourinary: Positive for decreased urine volume and difficulty urinating. Negative for dysuria and hematuria. Musculoskeletal: Negative for back pain. Skin: Negative for rash. Allergic/Immunologic: Negative for immunocompromised state. Neurological: Negative for syncope, weakness and headaches. Psychiatric/Behavioral: Negative for confusion. Vitals:    05/27/21 1316   BP: 108/85   Pulse: 89   Resp: 16   Temp: 97.6 °F (36.4 °C)   SpO2: 100%   Weight: 53.2 kg (117 lb 4.6 oz)   Height: 5' 7\" (1.702 m)            Physical Exam  Vitals and nursing note reviewed. Constitutional:       General: She is not in acute distress. Appearance: She is well-developed. HENT:      Head: Normocephalic and atraumatic. Eyes:      General: No scleral icterus.   Neck:      Trachea: No tracheal deviation. Cardiovascular:      Rate and Rhythm: Normal rate and regular rhythm. Heart sounds: Normal heart sounds. Pulmonary:      Effort: Pulmonary effort is normal. No respiratory distress. Breath sounds: Normal breath sounds. Abdominal:      General: There is no distension. Palpations: Abdomen is soft. Tenderness: There is no abdominal tenderness. Musculoskeletal:         General: Normal range of motion. Skin:     General: Skin is warm and dry. Neurological:      Mental Status: She is alert and oriented to person, place, and time. Cranial Nerves: No cranial nerve deficit. MDM  Number of Diagnoses or Management Options  Lower urinary tract symptoms (LUTS)  Diagnosis management comments: 52 yoF hx CKD Stage 4, presenting reporting decreased UOP. Possible renal failure given hx. Will check BUN/Cr, UA. Bladder scan. VS do not mandate IVF, however pt familiar with her hx and believes they would benefit. 1L NS ordered. ED EKG interpretation: 5/27/21 1318  Rhythm: normal sinus rhythm; and regular . Rate (approx.): 88; Axis: normal; P wave: normal; QRS interval: normal ; ST/T wave: normal; in  Lead: NA; Other findings: normal.   EKG interpreted by Lizzeth Day ED MD.      Medications Administered     sodium chloride 0.9 % bolus infusion 1,000 mL     Admin Date  05/27/2021 Action  New Bag Dose  1,000 mL Rate  1,000 mL/hr Route  IntraVENous Administered By  Onur Moreno RN                   Amount and/or Complexity of Data Reviewed  Clinical lab tests: ordered and reviewed      ED Course as of May 29 1257   Thu May 27, 2021   1738 At baseline.    Creatinine(!): 3.35 [SL]   1740 Pt feels well, feels comfortable going home.    [SL]      ED Course User Index  [SL] Kiki Russell MD       Procedures        Signed By: Lizzeth Day MD     May 29, 2021

## 2021-05-27 NOTE — ED TRIAGE NOTES
Pt arrives with no urine output since 0530 this morning. Pt reports feeling crampy and dehydrated and reports having stage 4 kidney disease.

## 2021-05-27 NOTE — Clinical Note
[address] 
[facility] 
[address] [address] [phone] Work/School Note Date: 5/27/2021 To Whom It May concern: 
 
Efren Pollack was seen and treated today in the emergency room by the following provider(s): 
Attending Provider: Annabelle Gillespie MD. Efren Pollack is excused from work/school on 05/27/21 and 05/28/21. She is medically clear to return to work/school on 5/29/2021. Sincerely, Hira Bhagat MD

## 2021-05-28 LAB
ATRIAL RATE: 88 BPM
CALCULATED P AXIS, ECG09: 83 DEGREES
CALCULATED R AXIS, ECG10: 68 DEGREES
CALCULATED T AXIS, ECG11: 67 DEGREES
DIAGNOSIS, 93000: NORMAL
P-R INTERVAL, ECG05: 122 MS
Q-T INTERVAL, ECG07: 366 MS
QRS DURATION, ECG06: 72 MS
QTC CALCULATION (BEZET), ECG08: 442 MS
VENTRICULAR RATE, ECG03: 88 BPM

## 2021-06-22 ENCOUNTER — PATIENT MESSAGE (OUTPATIENT)
Dept: CARDIOLOGY CLINIC | Age: 51
End: 2021-06-22

## 2021-06-22 NOTE — TELEPHONE ENCOUNTER
Attempted to reach patient by telephone. A message was left stating scheduled for IPV 7-8-21 at 1120 if she needs to be seen sooner please call back.

## 2021-07-08 ENCOUNTER — OFFICE VISIT (OUTPATIENT)
Dept: CARDIOLOGY CLINIC | Age: 51
End: 2021-07-08
Payer: COMMERCIAL

## 2021-07-08 VITALS
HEART RATE: 95 BPM | BODY MASS INDEX: 18.49 KG/M2 | SYSTOLIC BLOOD PRESSURE: 100 MMHG | HEIGHT: 68 IN | DIASTOLIC BLOOD PRESSURE: 60 MMHG | OXYGEN SATURATION: 100 % | WEIGHT: 122 LBS | RESPIRATION RATE: 16 BRPM

## 2021-07-08 DIAGNOSIS — I34.1 MVP (MITRAL VALVE PROLAPSE): ICD-10-CM

## 2021-07-08 DIAGNOSIS — N18.4 CKD (CHRONIC KIDNEY DISEASE) STAGE 4, GFR 15-29 ML/MIN (HCC): ICD-10-CM

## 2021-07-08 DIAGNOSIS — Z82.49 FAMILY HISTORY OF CORONARY ARTERIOSCLEROSIS: ICD-10-CM

## 2021-07-08 DIAGNOSIS — I51.9 LEFT VENTRICULAR DYSFUNCTION: ICD-10-CM

## 2021-07-08 DIAGNOSIS — I95.1 ORTHOSTATIC HYPOTENSION: Primary | ICD-10-CM

## 2021-07-08 PROCEDURE — 99214 OFFICE O/P EST MOD 30 MIN: CPT | Performed by: SPECIALIST

## 2021-07-08 NOTE — PROGRESS NOTES
Dat Khanna     1970       Jennifer Hernandez MD, [facility]  Date of Visit-7/8/2021   PCP is Miryam Moore MD   [facility] [place]  [facility]  HPI:  Dat Khanna is a 52 y.o. female   3 month f/u of  · Relative hypotension with syncope, orthostasis with acute gastroenteritis  · Started on midodrine 2-22-21 (Florinef earlier)   · Echo with mild reduction in EF-has CROCKETT,fatigue  · Fu note from Renal is now reviewed today  · Stage 4 CKD  · Covid Jan 2021  · Father with MI and valve replacement    Since last visit she has been seen at [facility] for possible kidney transplant. Notes are reviewed. Pt reports that she will be moving forward with kidney transplant and notes she will need a EKG and CXR prior to her transplant. She continues to have nausea, vomiting, and diarrhea due to kidney failure which causes dehydration. She denies any syncopal episodes. Pt also continues to have sxs of tachycardia, SOB, and CP but this has been occurring less. Positive for chest pain tach or abdominal pain swelling of feet dizziness and shortness of breath with lightheadedness went to the ER on May 27 had a kidney biopsy on Orin 15 saw Dr. Stella Muniz for renal on 6-21. [facility] transplant office visit 7-21 committee review 7/6/2021 are noted in the chart. Assessment/Plan:     1. Orthostatic hypotension  Significant orthostasis but has improved on midodrine 10mg TID. No new syncope. 2. Left ventricular dysfunction  EF 50%. 02/23/21    ECHO ADULT COMPLETE 02/25/2021 2/25/2021    Interpretation Summary  · LV: Calculated LVEF is 50%. Visually measured ejection fraction. Normal cavity size, wall thickness and systolic function (ejection fraction normal). Abnormal left ventricular strain. Global longitudinal strain is -16.5%. Mild (grade 1) left ventricular diastolic dysfunction. · MV: Mild mitral valve prolapse of the posterior leaflet.  Poor paraternal window; best seen in Apical imaging Mild mitral valve regurgitation is present. · Image quality for this study was technically difficult. Hard to assess EF. Signed by: Darlyn Hernández MD on 2/25/2021  7:36 PM    No CHF. Reassess EF and do nuclear in September prior to kidney transplant. 3. MVP (mitral valve prolapse)  Went over that the MVP and MR are not likely to need the valve placement    4. CKD (chronic kidney disease) stage 4, GFR 15-29 ml/min ([facility])  Now undergoing transplant workup. Testing will be done but I have no objection to surgery. I have no objection to the planned  surgery. Patient seems to be at a low risk for lemuel-operative severe adverse cardiac events. 5. Family history of coronary arteriosclerosis   02/23/21    ECHO ADULT COMPLETE 02/25/2021 2/25/2021    Interpretation Summary  · LV: Calculated LVEF is 50%. Visually measured ejection fraction. Normal cavity size, wall thickness and systolic function (ejection fraction normal). Abnormal left ventricular strain. Global longitudinal strain is -16.5%. Mild (grade 1) left ventricular diastolic dysfunction. · MV: Mild mitral valve prolapse of the posterior leaflet. Poor paraternal window; best seen in Apical imaging Mild mitral valve regurgitation is present. · Image quality for this study was technically difficult. Hard to assess EF. Signed by: Darlyn Hernández MD on 2/25/2021  7:36 PM          Follow up in 6 months. Patient Instructions   We will see you back in [facility] for an EKG, nuclear stress test and an echocardigoram. Please have your chest xray completed then as well. Stress test:    Please arrive 15 minutes prior to your appointment. Wear comfortable clothing and walking or athletic shoes. Do not eat or drink anything, except water, for at least 4 hours prior to your appointment.  Avoid tobacco products for at least 12 hours prior to your test.    Do not eat or drink anything containing caffeine, including but not limited to the following: chocolate, regular and decaffeinated coffee, soft drinks, or tea for at least 24 hours prior to your test.    Do not hold your scheduled medications prior to your test.      Your test will be performed on a 1 day protocol. This is determined by your height, weight, and other risk factors. For a 1 day test, please allow for 4 hours in the office that day. Future Appointments   Date Time Provider Storm Ana   9/29/2021  8:00 AM ECHO, ALEXANDER RIVERA AMB   9/29/2021  9:00 AM NUCLEAR, ALEXANDER RIVERA AMB   1/7/2022  8:40 AM Jennifer Hernandez MD CAVREY BS AMB          Impression:   1. Orthostatic hypotension    2. Left ventricular dysfunction    3. MVP (mitral valve prolapse)    4. CKD (chronic kidney disease) stage 4, GFR 15-29 ml/min ([facility])    5. Family history of coronary arteriosclerosis       Cardiac History:   No specialty comments available. ROS-except as noted above. . A complete cardiac and respiratory are reviewed and negative except as above ; Resp-denies wheezing  or productive cough,. Const- No unusual weight loss or fever; Neuro-no recent seizure or CVA ; GI- No BRBPR, abdom pain, bloating ; - no  hematuria   see supplement sheet, initialed and to be scanned by staff  Past Medical History:   Diagnosis Date    Chronic kidney disease     right kidney function 8%, 1/4 size    Depression     Hypertension     Seizures [facility]     Stage 4 chronic kidney disease ([facility]       Social Hx= reports that she quit smoking about 25 years ago. Her smoking use included cigarettes. She has never used smokeless tobacco. She reports previous alcohol use of about 20.8 standard drinks of alcohol per week. She reports that she does not use drugs. Exam and Labs:  /60   Pulse 95   Resp 16   Ht 5' 8\" (1.727 m)   Wt 122 lb (55.3 kg)   SpO2 100%   BMI 18.55 kg/m² Constitutional:  NAD, comfortable  Head: NC,AT. Eyes: No scleral icterus. Neck:  Neck supple. No JVD present. Throat: moist mucous membranes. Chest: Effort normal & normal respiratory excursion . Neurological: alert, conversant and oriented . Skin: Skin is not cold. No obvious systemic rash noted. Not diaphoretic. No erythema. Psychiatric:  Grossly normal mood and affect. Behavior appears normal. Extremities:  no clubbing or cyanosis. Abdomen: non distended    Lungs:breath sounds normal. No stridor. distress, wheezes or  Rales. Heart: normal rate, regular rhythm, normal S1, S2, no murmurs, rubs, clicks or gallops , PMI non displaced. Edema: Edema is none. No results found for: CHOL, CHOLX, CHLST, CHOLV, HDL, HDLP, LDL, LDLC, DLDLP, TGLX, TRIGL, TRIGP, CHHD, CHHDX  Lab Results   Component Value Date/Time    Sodium 134 (L) 05/27/2021 01:22 PM    Potassium 4.5 05/27/2021 01:22 PM    Chloride 96 (L) 05/27/2021 01:22 PM    CO2 25 05/27/2021 01:22 PM    Anion gap 13 05/27/2021 01:22 PM    Glucose 85 05/27/2021 01:22 PM    BUN 38 (H) 05/27/2021 01:22 PM    Creatinine 3.35 (H) 05/27/2021 01:22 PM    BUN/Creatinine ratio 11 (L) 05/27/2021 01:22 PM    GFR est AA 18 (L) 05/27/2021 01:22 PM    GFR est non-AA 15 (L) 05/27/2021 01:22 PM    Calcium 8.7 05/27/2021 01:22 PM      Wt Readings from Last 3 Encounters:   07/08/21 122 lb (55.3 kg)   05/27/21 117 lb 4.6 oz (53.2 kg)   02/23/21 115 lb (52.2 kg)      BP Readings from Last 3 Encounters:   07/08/21 100/60   05/27/21 102/82   02/22/21 (!) 98/50      Current Outpatient Medications   Medication Sig    pantoprazole (PROTONIX) 40 mg tablet TAKE 1 TABLET BY MOUTH EVERY MORNING    ondansetron (ZOFRAN ODT) 8 mg disintegrating tablet DISSOLVE 1 TABLET ON THE TONGUE TWICE A DAY AS NEEDED    midodrine (PROAMATINE) 10 mg tablet Take 1 Tab by mouth three (3) times daily (with meals).     busPIRone (BUSPAR) 10 mg tablet TAKE 1 TABLET BY MOUTH TWICE A DAY    albuterol (PROVENTIL HFA, VENTOLIN HFA, PROAIR HFA) 90 mcg/actuation inhaler INHALE 1 PUFF BY MOUTH EVERY 4 HOURS AS NEEDED    venlafaxine-SR (EFFEXOR-XR) 150 mg capsule     pregabalin (LYRICA) 50 mg capsule Take 50 mg by mouth two (2) times a day.  amitriptyline (ELAVIL) 10 mg tablet Take  by mouth nightly.  gabapentin (NEURONTIN) 300 mg capsule Take 2 Caps by mouth three (3) times daily. No current facility-administered medications for this visit. Impression see above.       Written by Mela Pedraza, as dictated by Woody Epperson MD.

## 2021-07-08 NOTE — PATIENT INSTRUCTIONS
We will see you back in [facility] for an EKG, nuclear stress test and an echocardigoram. Please have your chest xray completed then as well. Stress test:    Please arrive 15 minutes prior to your appointment. Wear comfortable clothing and walking or athletic shoes. Do not eat or drink anything, except water, for at least 4 hours prior to your appointment. Avoid tobacco products for at least 12 hours prior to your test.    Do not eat or drink anything containing caffeine, including but not limited to the following: chocolate, regular and decaffeinated coffee, soft drinks, or tea for at least 24 hours prior to your test.    Do not hold your scheduled medications prior to your test.      Your test will be performed on a 1 day protocol. This is determined by your height, weight, and other risk factors. For a 1 day test, please allow for 4 hours in the office that day.

## 2021-09-16 ENCOUNTER — DOCUMENTATION ONLY (OUTPATIENT)
Dept: CARDIOLOGY CLINIC | Age: 51
End: 2021-09-16

## 2021-09-16 NOTE — PROGRESS NOTES
Patient did not show for appointment today     Future Appointments   Date Time Provider Storm Perez   9/29/2021  8:00 AM ALEXANDER GARCIA BS AMB   9/29/2021  9:00 AM ALEXANDER MALDONADO AMB   1/7/2022  8:40 AM Jennifer Hernandez MD CAVREY BS AMB

## 2021-09-29 ENCOUNTER — ANCILLARY PROCEDURE (OUTPATIENT)
Dept: CARDIOLOGY CLINIC | Age: 51
End: 2021-09-29

## 2021-09-29 ENCOUNTER — HOSPITAL ENCOUNTER (OUTPATIENT)
Dept: GENERAL RADIOLOGY | Age: 51
Discharge: HOME | End: 2021-09-29
Attending: SPECIALIST
Payer: COMMERCIAL

## 2021-09-29 ENCOUNTER — ANCILLARY PROCEDURE (OUTPATIENT)
Dept: CARDIOLOGY CLINIC | Age: 51
End: 2021-09-29
Payer: COMMERCIAL

## 2021-09-29 VITALS
WEIGHT: 122 LBS | HEIGHT: 68 IN | DIASTOLIC BLOOD PRESSURE: 58 MMHG | SYSTOLIC BLOOD PRESSURE: 102 MMHG | BODY MASS INDEX: 18.49 KG/M2

## 2021-09-29 VITALS
DIASTOLIC BLOOD PRESSURE: 60 MMHG | SYSTOLIC BLOOD PRESSURE: 100 MMHG | BODY MASS INDEX: 18.49 KG/M2 | HEIGHT: 68 IN | WEIGHT: 122 LBS

## 2021-09-29 DIAGNOSIS — I10 UNSPECIFIED ESSENTIAL HYPERTENSION: ICD-10-CM

## 2021-09-29 DIAGNOSIS — I95.1 ORTHOSTATIC HYPOTENSION: ICD-10-CM

## 2021-09-29 DIAGNOSIS — I51.9 LEFT VENTRICULAR DYSFUNCTION: ICD-10-CM

## 2021-09-29 DIAGNOSIS — Z82.49 FAMILY HISTORY OF CORONARY ARTERIOSCLEROSIS: ICD-10-CM

## 2021-09-29 DIAGNOSIS — I34.1 MVP (MITRAL VALVE PROLAPSE): ICD-10-CM

## 2021-09-29 DIAGNOSIS — N18.4 CKD (CHRONIC KIDNEY DISEASE) STAGE 4, GFR 15-29 ML/MIN (HCC): ICD-10-CM

## 2021-09-29 LAB
ECHO AO ROOT DIAM: 2.72 CM
ECHO AV AREA PEAK VELOCITY: 2.2 CM2
ECHO AV AREA VTI: 2.11 CM2
ECHO AV AREA/BSA PEAK VELOCITY: 1.3 CM2/M2
ECHO AV AREA/BSA VTI: 1.3 CM2/M2
ECHO AV MEAN GRADIENT: 2.93 MMHG
ECHO AV PEAK GRADIENT: 4.95 MMHG
ECHO AV PEAK VELOCITY: 111.2 CM/S
ECHO AV VTI: 20.74 CM
ECHO EST RA PRESSURE: 3 MMHG
ECHO IVC PROX: 0.81 CM
ECHO LA AREA 4C: 9.36 CM2
ECHO LA MAJOR AXIS: 2.03 CM
ECHO LA MINOR AXIS: 1.22 CM
ECHO LA VOL 2C: 33.9 ML (ref 22–52)
ECHO LA VOL 4C: 16.68 ML (ref 22–52)
ECHO LA VOL BP: 28.3 ML (ref 22–52)
ECHO LA VOL/BSA BIPLANE: 17.05 ML/M2 (ref 16–28)
ECHO LA VOLUME INDEX A2C: 20.42 ML/M2 (ref 16–28)
ECHO LA VOLUME INDEX A4C: 10.05 ML/M2 (ref 16–28)
ECHO LV E' LATERAL VELOCITY: 8.33 CM/S
ECHO LV E' SEPTAL VELOCITY: 5.73 CM/S
ECHO LV EDV A2C: 48.44 ML
ECHO LV EDV A4C: 68.8 ML
ECHO LV EDV BP: 58.55 ML (ref 56–104)
ECHO LV EDV INDEX A4C: 41.4 ML/M2
ECHO LV EDV INDEX BP: 35.3 ML/M2
ECHO LV EDV NDEX A2C: 29.2 ML/M2
ECHO LV EJECTION FRACTION A2C: 47 PERCENT
ECHO LV EJECTION FRACTION A4C: 48 PERCENT
ECHO LV EJECTION FRACTION BIPLANE: 48.3 PERCENT (ref 55–100)
ECHO LV ESV A2C: 25.77 ML
ECHO LV ESV A4C: 35.86 ML
ECHO LV ESV BP: 30.3 ML (ref 19–49)
ECHO LV ESV INDEX A2C: 15.5 ML/M2
ECHO LV ESV INDEX A4C: 21.6 ML/M2
ECHO LV ESV INDEX BP: 18.3 ML/M2
ECHO LV GLOBAL LONGITUDINAL STRAIN (GLS): -18.3 PERCENT
ECHO LV INTERNAL DIMENSION DIASTOLIC: 3.36 CM (ref 3.9–5.3)
ECHO LV INTERNAL DIMENSION SYSTOLIC: 2.39 CM
ECHO LV IVSD: 0.68 CM (ref 0.6–0.9)
ECHO LV MASS 2D: 68.1 G (ref 67–162)
ECHO LV MASS INDEX 2D: 41 G/M2 (ref 43–95)
ECHO LV POSTERIOR WALL DIASTOLIC: 0.88 CM (ref 0.6–0.9)
ECHO LVOT DIAM: 1.98 CM
ECHO LVOT PEAK GRADIENT: 2.53 MMHG
ECHO LVOT PEAK VELOCITY: 79.52 CM/S
ECHO LVOT SV: 43.9 ML
ECHO LVOT VTI: 14.22 CM
ECHO MV A VELOCITY: 97.65 CM/S
ECHO MV AREA PHT: 3.78 CM2
ECHO MV E DECELERATION TIME (DT): 200.73 MS
ECHO MV E VELOCITY: 81.1 CM/S
ECHO MV E/A RATIO: 0.83
ECHO MV E/E' LATERAL: 9.74
ECHO MV E/E' RATIO (AVERAGED): 11.94
ECHO MV E/E' SEPTAL: 14.15
ECHO MV PRESSURE HALF TIME (PHT): 58.21 MS
ECHO RIGHT VENTRICULAR SYSTOLIC PRESSURE (RVSP): 24 MMHG
ECHO RV TAPSE: 1.76 CM (ref 1.5–2)
ECHO TV REGURGITANT MAX VELOCITY: 227.52 CM/S
ECHO TV REGURGITANT PEAK GRADIENT: 20.73 MMHG
GLOBAL LONGITUDINAL STRAIN 2 CHAMBER: -16.4 PERCENT
GLOBAL LONGITUDINAL STRAIN 4 CHAMBER: -18.4 PERCENT
GLOBAL LONGITUDINAL STRAIN LONG AXIS: -20.1 PERCENT
LA VOL DISK BP: 25.56 ML (ref 22–52)
LVOT MG: 1.37 MMHG
STRESS BASELINE DIAS BP: 58 MMHG
STRESS BASELINE HR: 104 BPM
STRESS BASELINE SYS BP: 102 MMHG
STRESS O2 SAT PEAK: 100 %
STRESS O2 SAT REST: 99 %
STRESS PEAK DIAS BP: 60 MMHG
STRESS PEAK SYS BP: 94 MMHG
STRESS PERCENT HR ACHIEVED: 71 %
STRESS POST PEAK HR: 121 BPM
STRESS RATE PRESSURE PRODUCT: NORMAL BPM*MMHG
STRESS ST DEPRESSION: 0 MM
STRESS ST ELEVATION: 0 MM
STRESS TARGET HR: 171 BPM

## 2021-09-29 PROCEDURE — 93306 TTE W/DOPPLER COMPLETE: CPT | Performed by: SPECIALIST

## 2021-09-29 PROCEDURE — A9500 TC99M SESTAMIBI: HCPCS | Performed by: SPECIALIST

## 2021-09-29 PROCEDURE — 71046 X-RAY EXAM CHEST 2 VIEWS: CPT

## 2021-09-29 PROCEDURE — 78452 HT MUSCLE IMAGE SPECT MULT: CPT | Performed by: SPECIALIST

## 2021-09-29 PROCEDURE — 93015 CV STRESS TEST SUPVJ I&R: CPT | Performed by: SPECIALIST

## 2021-09-29 RX ORDER — TETRAKIS(2-METHOXYISOBUTYLISOCYANIDE)COPPER(I) TETRAFLUOROBORATE 1 MG/ML
10 INJECTION, POWDER, LYOPHILIZED, FOR SOLUTION INTRAVENOUS ONCE
Status: COMPLETED | OUTPATIENT
Start: 2021-09-29 | End: 2021-09-29

## 2021-09-29 RX ORDER — TETRAKIS(2-METHOXYISOBUTYLISOCYANIDE)COPPER(I) TETRAFLUOROBORATE 1 MG/ML
30 INJECTION, POWDER, LYOPHILIZED, FOR SOLUTION INTRAVENOUS ONCE
Status: COMPLETED | OUTPATIENT
Start: 2021-09-29 | End: 2021-09-29

## 2021-09-29 RX ADMIN — TETRAKIS(2-METHOXYISOBUTYLISOCYANIDE)COPPER(I) TETRAFLUOROBORATE 8.3 MILLICURIE: 1 INJECTION, POWDER, LYOPHILIZED, FOR SOLUTION INTRAVENOUS at 09:30

## 2021-09-29 RX ADMIN — TETRAKIS(2-METHOXYISOBUTYLISOCYANIDE)COPPER(I) TETRAFLUOROBORATE 24.7 MILLICURIE: 1 INJECTION, POWDER, LYOPHILIZED, FOR SOLUTION INTRAVENOUS at 10:50

## 2021-11-22 ENCOUNTER — APPOINTMENT (OUTPATIENT)
Dept: GENERAL RADIOLOGY | Age: 51
End: 2021-11-22
Attending: EMERGENCY MEDICINE
Payer: COMMERCIAL

## 2021-11-22 ENCOUNTER — APPOINTMENT (OUTPATIENT)
Dept: CT IMAGING | Age: 51
End: 2021-11-22
Attending: EMERGENCY MEDICINE
Payer: COMMERCIAL

## 2021-11-22 ENCOUNTER — HOSPITAL ENCOUNTER (OUTPATIENT)
Age: 51
Setting detail: OBSERVATION
Discharge: HOME | End: 2021-11-24
Attending: EMERGENCY MEDICINE | Admitting: HOSPITALIST
Payer: COMMERCIAL

## 2021-11-22 DIAGNOSIS — R55 SYNCOPE AND COLLAPSE: ICD-10-CM

## 2021-11-22 DIAGNOSIS — G45.9 TIA (TRANSIENT ISCHEMIC ATTACK): Primary | ICD-10-CM

## 2021-11-22 DIAGNOSIS — N18.9 CHRONIC RENAL IMPAIRMENT, UNSPECIFIED CKD STAGE: ICD-10-CM

## 2021-11-22 LAB
ALBUMIN SERPL-MCNC: 3.8 G/DL (ref 3.5–5)
ALBUMIN/GLOB SERPL: 1 {RATIO} (ref 1.1–2.2)
ALP SERPL-CCNC: 63 U/L (ref 45–117)
ALT SERPL-CCNC: 24 U/L (ref 12–78)
ANION GAP SERPL CALC-SCNC: 15 MMOL/L (ref 5–15)
AST SERPL-CCNC: 29 U/L (ref 15–37)
ATRIAL RATE: 102 BPM
BASOPHILS # BLD: 0.1 K/UL (ref 0–0.1)
BASOPHILS NFR BLD: 1 % (ref 0–1)
BILIRUB SERPL-MCNC: 0.3 MG/DL (ref 0.2–1)
BUN SERPL-MCNC: 63 MG/DL (ref 6–20)
BUN/CREAT SERPL: 13 (ref 12–20)
CA-I BLD-MCNC: 1.08 MMOL/L (ref 1.12–1.32)
CA-I BLD-MCNC: 1.13 MMOL/L (ref 1.12–1.32)
CALCIUM SERPL-MCNC: 9.4 MG/DL (ref 8.5–10.1)
CALCULATED P AXIS, ECG09: 81 DEGREES
CALCULATED R AXIS, ECG10: 73 DEGREES
CALCULATED T AXIS, ECG11: 83 DEGREES
CHLORIDE BLD-SCNC: 88 MMOL/L (ref 98–107)
CHLORIDE BLD-SCNC: 92 MMOL/L (ref 98–107)
CHLORIDE SERPL-SCNC: 91 MMOL/L (ref 97–108)
CO2 SERPL-SCNC: 21 MMOL/L (ref 21–32)
COMMENT, HOLDF: NORMAL
CREAT BLD-MCNC: 4.71 MG/DL (ref 0.6–1.3)
CREAT BLD-MCNC: 5 MG/DL (ref 0.6–1.3)
CREAT SERPL-MCNC: 4.73 MG/DL (ref 0.55–1.02)
DIAGNOSIS, 93000: NORMAL
DIFFERENTIAL METHOD BLD: ABNORMAL
EOSINOPHIL # BLD: 0.1 K/UL (ref 0–0.4)
EOSINOPHIL NFR BLD: 1 % (ref 0–7)
ERYTHROCYTE [DISTWIDTH] IN BLOOD BY AUTOMATED COUNT: 15.7 % (ref 11.5–14.5)
GLOBULIN SER CALC-MCNC: 3.8 G/DL (ref 2–4)
GLUCOSE BLD-MCNC: 89 MG/DL (ref 65–100)
GLUCOSE BLD-MCNC: 96 MG/DL (ref 65–100)
GLUCOSE SERPL-MCNC: 104 MG/DL (ref 65–100)
HCT VFR BLD AUTO: 32.7 % (ref 35–47)
HGB BLD-MCNC: 10.2 G/DL (ref 11.5–16)
IMM GRANULOCYTES # BLD AUTO: 0.1 K/UL (ref 0–0.04)
IMM GRANULOCYTES NFR BLD AUTO: 1 % (ref 0–0.5)
LYMPHOCYTES # BLD: 1.4 K/UL (ref 0.8–3.5)
LYMPHOCYTES NFR BLD: 12 % (ref 12–49)
MCH RBC QN AUTO: 24.2 PG (ref 26–34)
MCHC RBC AUTO-ENTMCNC: 31.2 G/DL (ref 30–36.5)
MCV RBC AUTO: 77.5 FL (ref 80–99)
MONOCYTES # BLD: 0.8 K/UL (ref 0–1)
MONOCYTES NFR BLD: 7 % (ref 5–13)
NEUTS SEG # BLD: 9 K/UL (ref 1.8–8)
NEUTS SEG NFR BLD: 78 % (ref 32–75)
NRBC # BLD: 0 K/UL (ref 0–0.01)
NRBC BLD-RTO: 0 PER 100 WBC
P-R INTERVAL, ECG05: 132 MS
PLATELET # BLD AUTO: 553 K/UL (ref 150–400)
PMV BLD AUTO: 12.6 FL (ref 8.9–12.9)
POTASSIUM BLD-SCNC: 3.5 MMOL/L (ref 3.5–5.1)
POTASSIUM BLD-SCNC: 4.1 MMOL/L (ref 3.5–5.1)
POTASSIUM SERPL-SCNC: 3.5 MMOL/L (ref 3.5–5.1)
PROT SERPL-MCNC: 7.6 G/DL (ref 6.4–8.2)
Q-T INTERVAL, ECG07: 350 MS
QRS DURATION, ECG06: 72 MS
QTC CALCULATION (BEZET), ECG08: 456 MS
RBC # BLD AUTO: 4.22 M/UL (ref 3.8–5.2)
SAMPLES BEING HELD,HOLD: NORMAL
SERVICE CMNT-IMP: ABNORMAL
SERVICE CMNT-IMP: ABNORMAL
SODIUM BLD-SCNC: 128 MMOL/L (ref 136–145)
SODIUM BLD-SCNC: 128 MMOL/L (ref 136–145)
SODIUM SERPL-SCNC: 127 MMOL/L (ref 136–145)
TROPONIN-HIGH SENSITIVITY: 9 NG/L (ref 0–51)
VENTRICULAR RATE, ECG03: 102 BPM
WBC # BLD AUTO: 11.4 K/UL (ref 3.6–11)

## 2021-11-22 PROCEDURE — 70450 CT HEAD/BRAIN W/O DYE: CPT

## 2021-11-22 PROCEDURE — G0378 HOSPITAL OBSERVATION PER HR: HCPCS

## 2021-11-22 PROCEDURE — 71045 X-RAY EXAM CHEST 1 VIEW: CPT

## 2021-11-22 PROCEDURE — 99285 EMERGENCY DEPT VISIT HI MDM: CPT

## 2021-11-22 PROCEDURE — 74011250637 HC RX REV CODE- 250/637: Performed by: EMERGENCY MEDICINE

## 2021-11-22 PROCEDURE — 80047 BASIC METABLC PNL IONIZED CA: CPT

## 2021-11-22 PROCEDURE — 93005 ELECTROCARDIOGRAM TRACING: CPT

## 2021-11-22 PROCEDURE — 36415 COLL VENOUS BLD VENIPUNCTURE: CPT

## 2021-11-22 PROCEDURE — 84484 ASSAY OF TROPONIN QUANT: CPT

## 2021-11-22 PROCEDURE — 80053 COMPREHEN METABOLIC PANEL: CPT

## 2021-11-22 PROCEDURE — 85025 COMPLETE CBC W/AUTO DIFF WBC: CPT

## 2021-11-22 RX ORDER — ACETAMINOPHEN 325 MG/1
650 TABLET ORAL
Status: COMPLETED | OUTPATIENT
Start: 2021-11-23 | End: 2021-11-22

## 2021-11-22 RX ORDER — CALCIUM GLUCONATE 20 MG/ML
2 INJECTION, SOLUTION INTRAVENOUS ONCE
Status: DISCONTINUED | OUTPATIENT
Start: 2021-11-22 | End: 2021-11-23

## 2021-11-22 RX ADMIN — ACETAMINOPHEN 650 MG: 325 TABLET ORAL at 23:31

## 2021-11-22 NOTE — ED TRIAGE NOTES
Patient arrives stating she as end state renal disease and chronic hypotension. Pt endorses she is here today for full body cramping x4 days.  +sob

## 2021-11-22 NOTE — ED PROVIDER NOTES
HPI the patient has a 1 week history of having episodes of \"collapse\", sometimes due to syncope. She also has a several day history of episodes of right arm weakness that last approximately 1 minute and resolved. She has approximately 2 of these per day. She also complains of intermittent blurred vision/diplopia. She also complains of \"full body cramping\" intermittently for 4 days. She admits to some shortness of breath but she denies chest pain, fever, headache, slurred speech. She has a history of end-stage renal disease, but is not on dialysis and chronic hypotension.     Past Medical History:   Diagnosis Date    Chronic kidney disease     right kidney function 8%, 1/ size    Depression     Hypertension     Seizures (HCC)     Stage 4 chronic kidney disease (HCC)        Past Surgical History:   Procedure Laterality Date    HX GYN      4 pregnancies/ 3 children/1 miscarriage    DE BREAST SURGERY PROCEDURE UNLISTED  2007    augmentation         Family History:   Problem Relation Age of Onset    Cancer Mother         myelofibrosis    Depression Father     Diabetes Maternal Grandfather     Cancer Paternal Grandmother         brain tumor and lung        Social History     Socioeconomic History    Marital status:      Spouse name: Not on file    Number of children: Not on file    Years of education: Not on file    Highest education level: Not on file   Occupational History    Not on file   Tobacco Use    Smoking status: Former Smoker     Types: Cigarettes     Quit date: 1996     Years since quittin.9    Smokeless tobacco: Never Used   Substance and Sexual Activity    Alcohol use: Not Currently     Alcohol/week: 20.8 standard drinks     Types: 25 Glasses of wine per week    Drug use: No    Sexual activity: Yes     Partners: Male   Other Topics Concern    Not on file   Social History Narrative    Not on file     Social Determinants of Health     Financial Resource Strain:    [employer] Difficulty of Paying Living Expenses: Not on file   Food Insecurity:     Worried About Running Out of Food in the Last Year: Not on file    Ran Out of Food in the Last Year: Not on file   Transportation Needs:     Lack of Transportation (Medical): Not on file    Lack of Transportation (Non-Medical): Not on file   Physical Activity:     Days of Exercise per Week: Not on file    Minutes of Exercise per Session: Not on file   Stress:     Feeling of Stress : Not on file   Social Connections:     Frequency of Communication with Friends and Family: Not on file    Frequency of Social Gatherings with Friends and Family: Not on file    Attends [employer] Services: Not on file    Active Member of [address] [place] [address] [email or web address] or Organizations: Not on file    Attends Club or Organization Meetings: Not on file    Marital Status: Not on file   Intimate Partner Violence:     Fear of Current or Ex-Partner: Not on file    Emotionally Abused: Not on file    Physically Abused: Not on file    Sexually Abused: Not on file   Housing Stability:     Unable to Pay for Housing in the Last Year: Not on file    Number of [place] in the Last Year: Not on file    Unstable Housing in the Last Year: Not on file         ALLERGIES: Patient has no known allergies. Review of Systems   Constitutional: Positive for fatigue. Negative for fever. HENT: Negative for voice change. Eyes: Positive for visual disturbance. Respiratory: Positive for shortness of breath. Negative for cough. Cardiovascular: Negative for chest pain. Gastrointestinal: Positive for nausea and vomiting. Negative for abdominal pain. Genitourinary: Negative for flank pain. Musculoskeletal: Negative for back pain. Skin: Negative for rash. Neurological: Positive for syncope and weakness. Negative for headaches. Psychiatric/Behavioral: Negative for confusion.        Vitals:    11/22/21 1726   BP: 117/88   Pulse: (!) 102   Resp: 17   Temp: 98.3 °F (36.8 °C)   SpO2: 100%   Weight: 56.5 kg (124 lb 9 oz)   Height: 5' 7\" (1.702 m)            Physical Exam  Constitutional:       General: She is not in acute distress. Appearance: She is well-developed. HENT:      Head: Normocephalic. Eyes:      Pupils: Pupils are equal, round, and reactive to light. Cardiovascular:      Rate and Rhythm: Normal rate. Heart sounds: No murmur heard. Pulmonary:      Effort: Pulmonary effort is normal.      Breath sounds: Normal breath sounds. Abdominal:      Palpations: Abdomen is soft. Tenderness: There is no abdominal tenderness. Musculoskeletal:         General: Normal range of motion. Cervical back: Normal range of motion. Skin:     General: Skin is warm and dry. Capillary Refill: Capillary refill takes less than 2 seconds. Neurological:      General: No focal deficit present. Mental Status: She is alert and oriented to person, place, and time. Motor: No weakness. Psychiatric:         Behavior: Behavior normal.          MDM       Procedures ED EKG interpretation:  Rhythm: Sinus tach, rate 102. No acute ST changes. This EKG was interpreted by Karlee Joseph MD,ED Provider. Perfect Serve Consult for Admission  7:19 PM    ED Room Number: SER05/05  Patient Name and age:  Rimma Valerio 54 y.o.  female  Working Diagnosis:   1. TIA (transient ischemic attack)    2. Syncope and collapse    3. Chronic renal impairment, unspecified CKD stage        COVID-19 Suspicion:  no  Sepsis present:  no  Reassessment needed: no  Code Status:  Full Code  Readmission: no  Isolation Requirements:  no  Recommended Level of Care:  telemetry  Department:[facility] - [phone]  Other: Patient came in with a chief complaint of \"full body cramps\" for 4 days and intermittent shortness of breath. She has history of end-stage renal disease and chronic hypertension and is followed at [facility].   She then gave a history of multiple episodes over the past week of \"collapse\", some of these episodes involved syncope. She then gave a history of multiple episodes over the past several days of her right arm becoming completely weak for several minutes. We do not have CMP here right now and a point-of-care Chem-8 was repeated several times for possible hypocalcemia. The patient may be hypocalcemic but I am not sure of that at this point.

## 2021-11-23 LAB
ANION GAP SERPL CALC-SCNC: 10 MMOL/L (ref 5–15)
BUN SERPL-MCNC: 65 MG/DL (ref 6–20)
BUN/CREAT SERPL: 15 (ref 12–20)
CALCIUM SERPL-MCNC: 9.1 MG/DL (ref 8.5–10.1)
CHLORIDE SERPL-SCNC: 96 MMOL/L (ref 97–108)
CO2 SERPL-SCNC: 24 MMOL/L (ref 21–32)
CORTIS AM PEAK SERPL-MCNC: 11.5 UG/DL (ref 4.3–22.45)
CREAT SERPL-MCNC: 4.44 MG/DL (ref 0.55–1.02)
GLUCOSE SERPL-MCNC: 102 MG/DL (ref 65–100)
MAGNESIUM SERPL-MCNC: 2.7 MG/DL (ref 1.6–2.4)
POTASSIUM SERPL-SCNC: 4 MMOL/L (ref 3.5–5.1)
SODIUM SERPL-SCNC: 130 MMOL/L (ref 136–145)
T4 FREE SERPL-MCNC: 1.1 NG/DL (ref 0.8–1.5)
TSH SERPL DL<=0.05 MIU/L-ACNC: 4.04 UIU/ML (ref 0.36–3.74)

## 2021-11-23 PROCEDURE — 97530 THERAPEUTIC ACTIVITIES: CPT

## 2021-11-23 PROCEDURE — 74011250636 HC RX REV CODE- 250/636: Performed by: INTERNAL MEDICINE

## 2021-11-23 PROCEDURE — 96374 THER/PROPH/DIAG INJ IV PUSH: CPT

## 2021-11-23 PROCEDURE — G0378 HOSPITAL OBSERVATION PER HR: HCPCS

## 2021-11-23 PROCEDURE — 82533 TOTAL CORTISOL: CPT

## 2021-11-23 PROCEDURE — 74011250637 HC RX REV CODE- 250/637: Performed by: INTERNAL MEDICINE

## 2021-11-23 PROCEDURE — 82384 ASSAY THREE CATECHOLAMINES: CPT

## 2021-11-23 PROCEDURE — 99223 1ST HOSP IP/OBS HIGH 75: CPT | Performed by: SPECIALIST

## 2021-11-23 PROCEDURE — 36415 COLL VENOUS BLD VENIPUNCTURE: CPT

## 2021-11-23 PROCEDURE — 97161 PT EVAL LOW COMPLEX 20 MIN: CPT

## 2021-11-23 PROCEDURE — 96372 THER/PROPH/DIAG INJ SC/IM: CPT

## 2021-11-23 PROCEDURE — 74011250637 HC RX REV CODE- 250/637: Performed by: HOSPITALIST

## 2021-11-23 PROCEDURE — 83735 ASSAY OF MAGNESIUM: CPT

## 2021-11-23 PROCEDURE — 97116 GAIT TRAINING THERAPY: CPT

## 2021-11-23 PROCEDURE — 84439 ASSAY OF FREE THYROXINE: CPT

## 2021-11-23 PROCEDURE — 80048 BASIC METABOLIC PNL TOTAL CA: CPT

## 2021-11-23 PROCEDURE — 84443 ASSAY THYROID STIM HORMONE: CPT

## 2021-11-23 PROCEDURE — 74011250636 HC RX REV CODE- 250/636: Performed by: HOSPITALIST

## 2021-11-23 RX ORDER — ONDANSETRON 4 MG/1
4 TABLET, ORALLY DISINTEGRATING ORAL
Status: DISCONTINUED | OUTPATIENT
Start: 2021-11-23 | End: 2021-11-24 | Stop reason: HOSPADM

## 2021-11-23 RX ORDER — ACETAMINOPHEN 325 MG/1
650 TABLET ORAL
Status: DISCONTINUED | OUTPATIENT
Start: 2021-11-23 | End: 2021-11-24 | Stop reason: HOSPADM

## 2021-11-23 RX ORDER — GABAPENTIN 800 MG/1
800 TABLET ORAL 3 TIMES DAILY
COMMUNITY

## 2021-11-23 RX ORDER — HEPARIN SODIUM 5000 [USP'U]/ML
5000 INJECTION, SOLUTION INTRAVENOUS; SUBCUTANEOUS EVERY 8 HOURS
Status: DISCONTINUED | OUTPATIENT
Start: 2021-11-23 | End: 2021-11-24 | Stop reason: HOSPADM

## 2021-11-23 RX ORDER — ONDANSETRON 2 MG/ML
4 INJECTION INTRAMUSCULAR; INTRAVENOUS
Status: DISCONTINUED | OUTPATIENT
Start: 2021-11-23 | End: 2021-11-24 | Stop reason: HOSPADM

## 2021-11-23 RX ORDER — MIDODRINE HYDROCHLORIDE 2.5 MG/1
10 TABLET ORAL
Status: DISCONTINUED | OUTPATIENT
Start: 2021-11-23 | End: 2021-11-24 | Stop reason: HOSPADM

## 2021-11-23 RX ORDER — ACETAMINOPHEN 650 MG/1
650 SUPPOSITORY RECTAL
Status: DISCONTINUED | OUTPATIENT
Start: 2021-11-23 | End: 2021-11-24 | Stop reason: HOSPADM

## 2021-11-23 RX ORDER — PREGABALIN 25 MG/1
50 CAPSULE ORAL
Status: DISCONTINUED | OUTPATIENT
Start: 2021-11-23 | End: 2021-11-24 | Stop reason: HOSPADM

## 2021-11-23 RX ORDER — SODIUM CHLORIDE 0.9 % (FLUSH) 0.9 %
5-40 SYRINGE (ML) INJECTION AS NEEDED
Status: DISCONTINUED | OUTPATIENT
Start: 2021-11-23 | End: 2021-11-24 | Stop reason: HOSPADM

## 2021-11-23 RX ORDER — BUSPIRONE HYDROCHLORIDE 10 MG/1
10 TABLET ORAL 2 TIMES DAILY
Status: DISCONTINUED | OUTPATIENT
Start: 2021-11-23 | End: 2021-11-24 | Stop reason: HOSPADM

## 2021-11-23 RX ORDER — SODIUM CHLORIDE 9 MG/ML
100 INJECTION, SOLUTION INTRAVENOUS CONTINUOUS
Status: DISPENSED | OUTPATIENT
Start: 2021-11-23 | End: 2021-11-23

## 2021-11-23 RX ORDER — SODIUM CHLORIDE 0.9 % (FLUSH) 0.9 %
5-40 SYRINGE (ML) INJECTION EVERY 8 HOURS
Status: DISCONTINUED | OUTPATIENT
Start: 2021-11-23 | End: 2021-11-24 | Stop reason: HOSPADM

## 2021-11-23 RX ORDER — POLYETHYLENE GLYCOL 3350 17 G/17G
17 POWDER, FOR SOLUTION ORAL DAILY PRN
Status: DISCONTINUED | OUTPATIENT
Start: 2021-11-23 | End: 2021-11-24 | Stop reason: HOSPADM

## 2021-11-23 RX ORDER — VENLAFAXINE HYDROCHLORIDE 150 MG/1
150 CAPSULE, EXTENDED RELEASE ORAL
Status: DISCONTINUED | OUTPATIENT
Start: 2021-11-23 | End: 2021-11-24 | Stop reason: HOSPADM

## 2021-11-23 RX ORDER — BUSPIRONE HYDROCHLORIDE 10 MG/1
10 TABLET ORAL 3 TIMES DAILY
COMMUNITY
End: 2022-07-11

## 2021-11-23 RX ORDER — BUSPIRONE HYDROCHLORIDE 15 MG/1
10 TABLET ORAL 2 TIMES DAILY
Status: ON HOLD | COMMUNITY
End: 2021-11-23

## 2021-11-23 RX ADMIN — ONDANSETRON HYDROCHLORIDE 4 MG: 2 SOLUTION INTRAMUSCULAR; INTRAVENOUS at 03:25

## 2021-11-23 RX ADMIN — ACETAMINOPHEN 650 MG: 325 TABLET ORAL at 21:03

## 2021-11-23 RX ADMIN — BUSPIRONE HYDROCHLORIDE 10 MG: 10 TABLET ORAL at 18:21

## 2021-11-23 RX ADMIN — MIDODRINE HYDROCHLORIDE 10 MG: 2.5 TABLET ORAL at 12:59

## 2021-11-23 RX ADMIN — VENLAFAXINE HYDROCHLORIDE 150 MG: 150 CAPSULE, EXTENDED RELEASE ORAL at 21:03

## 2021-11-23 RX ADMIN — HEPARIN SODIUM 5000 UNITS: 5000 INJECTION INTRAVENOUS; SUBCUTANEOUS at 05:46

## 2021-11-23 RX ADMIN — HEPARIN SODIUM 5000 UNITS: 5000 INJECTION INTRAVENOUS; SUBCUTANEOUS at 14:47

## 2021-11-23 RX ADMIN — Medication 10 ML: at 05:46

## 2021-11-23 RX ADMIN — MIDODRINE HYDROCHLORIDE 10 MG: 2.5 TABLET ORAL at 18:21

## 2021-11-23 RX ADMIN — Medication 10 ML: at 22:00

## 2021-11-23 RX ADMIN — HEPARIN SODIUM 5000 UNITS: 5000 INJECTION INTRAVENOUS; SUBCUTANEOUS at 21:03

## 2021-11-23 RX ADMIN — SODIUM CHLORIDE 100 ML/HR: 9 INJECTION, SOLUTION INTRAVENOUS at 13:37

## 2021-11-23 RX ADMIN — BUSPIRONE HYDROCHLORIDE 10 MG: 10 TABLET ORAL at 10:44

## 2021-11-23 RX ADMIN — Medication 10 ML: at 14:47

## 2021-11-23 RX ADMIN — VENLAFAXINE HYDROCHLORIDE 150 MG: 150 CAPSULE, EXTENDED RELEASE ORAL at 10:44

## 2021-11-23 NOTE — CONSULTS
NEPHROLOGY CONSULT NOTE     Patient: Severo Rees MRN: [id number]  PCP: Winthrop Cabot, MD   :     1970  Age:   54 y.o. Sex:  female      Referring physician: Hemant Mcgrath DO  Reason for consultation: 54 y.o. female with Syncope [Q22] complicated by OCTAVIO   Admission Date: 2021  5:21 PM  LOS: 0 days      ASSESSMENT and PLAN :   OCTAVIO on CKD IV:  - suspect progression of underlying CKD but may have some volume depletion given her GI symptoms and hypotension  - trial of IVF x 1 L today  - daily labs and I/Os  - no urgent indication for RRT    CKD IV:  - 2/2 reflux nephropathy, renal atrophy and chronic changes  - baseline appears to be around 3.5  - had transplant eval at [facility]    Chronic hypotension:  - neg cardiac w/u so far  - would perform a stim test while here  - cont midodrine  - trial of fluids as above  - may need to consider florinef if the above fail to help her symptoms    Mild hyponatremia:  - stim test as above  - IVF as above  - monitor daily    Diffuse weakness  Neuropathy       Active Problems / Assessment AAActive  : Active Problems:    Syncope (2021)         Subjective:   HPI: Severo Rees is a 54 y.o.  female who has been admitted to the hospital for weakness and ongoing hypotension. She has a hx of CKD IV and is followed by Dr. Crista Cody. She had reflux nephropathy and atrophic R kidney. Renal bx in 2021 showed nonspecific chronic changes including  moderate arteriosclerosis-intimal thickening, mild to moderate interstitial fibrosis and associated tubular atrophy and mild interstitial inflammation. She is on midodrine 10mg TID for hypotension and is being followed by cardiology. Her w/u so far has been unremarkable. Her Cr is up to 4.3 today, last Cr was 3.7 in October with us. She had a cortisol level of 11 this AM.  Normal TFTs in the past.  Was due to see endocrinology for further work up but has not seen them yet. No cp, sob reported.   C/o nausea with diarrhea, which appears to be chronic. Past Medical Hx:   Past Medical History:   Diagnosis Date    Chronic kidney disease     right kidney function 8%, 1/4 size    Depression     Hypertension     Seizures (HCC)     Stage 4 chronic kidney disease (HCC)         Past Surgical Hx:     Past Surgical History:   Procedure Laterality Date    HX GYN      4 pregnancies/ 3 children/1 miscarriage    [place] BREAST SURGERY PROCEDURE UNLISTED  2007    augmentation       Medications:  Prior to Admission medications    Medication Sig Start Date End Date Taking? Authorizing Provider   gabapentin (NEURONTIN) 800 mg tablet Take 800 mg by mouth three (3) times daily. Yes Provider, Historical   busPIRone (BUSPAR) 10 mg tablet Take 10 mg by mouth three (3) times daily. Yes Provider, Historical   ondansetron (ZOFRAN ODT) 8 mg disintegrating tablet DISSOLVE 1 TABLET ON THE TONGUE TWICE A DAY AS NEEDED 3/4/21  Yes Provider, Historical   midodrine (PROAMATINE) 10 mg tablet Take 1 Tab by mouth three (3) times daily (with meals). 3/9/21  Yes Kin Alcantar MD   albuterol (PROVENTIL HFA, VENTOLIN HFA, PROAIR HFA) 90 mcg/actuation inhaler INHALE 1 PUFF BY MOUTH EVERY 4 HOURS AS NEEDED 1/18/21  Yes Other, MD Brendan   venlafaxine-SR (EFFEXOR-XR) 150 mg capsule Take 150 mg by mouth daily. 2/9/21  Yes Other, MD Brendan   pregabalin (LYRICA) 50 mg capsule Take 50 mg by mouth two (2) times daily as needed (neuropathy/itching related to kidney disease). Yes Other, MD Brendan   amitriptyline (ELAVIL) 10 mg tablet Take  by mouth nightly. Yes Provider, Historical       No Known Allergies    Social Hx:  reports that she quit smoking about 25 years ago. Her smoking use included cigarettes. She has never used smokeless tobacco. She reports previous alcohol use of about 20.8 standard drinks of alcohol per week. She reports that she does not use drugs.      Family History   Problem Relation Age of Onset    Cancer Mother myelofibrosis    Depression Father     Diabetes Maternal Grandfather     Cancer Paternal Grandmother         brain tumor and lung        Review of Systems:  A twelve point review of system was performed today. Pertinent positives and negatives are mentioned in the HPI. The reminder of the ROS is negative and noncontributory. Objective:    Vitals:    Vitals:    11/23/21 0327 11/23/21 0409 11/23/21 0607 11/23/21 0828   BP: 110/72   105/62   Pulse: (!) 103 99 (!) 101 (!) 101   Resp:    16   Temp:    97.8 °F (36.6 °C)   SpO2:    100%   Weight:       Height:         I&O's:  No intake/output data recorded. Visit Vitals  /62   Pulse (!) 101   Temp 97.8 °F (36.6 °C)   Resp 16   Ht 5' 7\" (1.702 m)   Wt 56.5 kg (124 lb 9 oz)   SpO2 100%   BMI 19.51 kg/m²       Physical Exam:  General:Alert, No distress,   HEENT: Eyes are PERRL. Conjunctiva without pallor ,erythema. The sclerae without icterus. .   Neck:Supple,no mass palpable  Lungs : Clears to auscultation Bilaterally, Normal respiratory effort  CVS: RRR, S1 S2 normal, No rub,  no LE edema  Abdomen: Soft, Non tender, No hepatosplenomegaly, bowel sounds present  Extremities: No cyanosis, No clubbing  Skin: No rash or lesions.   Lymph nodes: No palpable nodes  MS: No joint swelling, erythema, warmth  Neurologic: non focal, AAO x 3  Psych: normal affect    Laboratory Results:    Lab Results   Component Value Date    BUN 65 (H) 11/23/2021     (L) 11/23/2021    K 4.0 11/23/2021    CL 96 (L) 11/23/2021    CO2 24 11/23/2021       Lab Results   Component Value Date    BUN 65 (H) 11/23/2021    BUN 63 (H) 11/22/2021    BUN 38 (H) 05/27/2021    BUN 41 (H) 02/14/2021    BUN 14 05/08/2013    K 4.0 11/23/2021    K 3.5 11/22/2021    K 4.5 05/27/2021    K 2.9 (L) 02/14/2021    K 4.3 05/08/2013       Lab Results   Component Value Date    WBC 11.4 (H) 11/22/2021    RBC 4.22 11/22/2021    HGB 10.2 (L) 11/22/2021    HCT 32.7 (L) 11/22/2021    MCV 77.5 (L) 11/22/2021    MCH 24.2 (L) 11/22/2021    RDW 15.7 (H) 11/22/2021     (H) 11/22/2021       No results found for: PTH, PHOS    Urine dipstick:   Lab Results   Component Value Date/Time    Color YELLOW/STRAW 05/27/2021 02:06 PM    Appearance CLEAR 05/27/2021 02:06 PM    Specific gravity 1.020 05/27/2021 02:06 PM    Specific gravity 1.014 01/19/2009 06:48 PM    pH (UA) 5.5 05/27/2021 02:06 PM    Protein TRACE (A) 05/27/2021 02:06 PM    Glucose Negative 05/27/2021 02:06 PM    Ketone Negative 05/27/2021 02:06 PM    Bilirubin Negative 05/27/2021 02:06 PM    Urobilinogen 0.2 05/27/2021 02:06 PM    Nitrites Negative 05/27/2021 02:06 PM    Leukocyte Esterase Negative 05/27/2021 02:06 PM    Epithelial cells MODERATE (A) 05/27/2021 02:06 PM    Bacteria 1+ (A) 05/27/2021 02:06 PM    WBC 0-4 05/27/2021 02:06 PM    RBC 0-5 05/27/2021 02:06 PM       I have reviewed the following: All pertinent labs, microbiology data, radiology imaging for my assessment         Thank you for allowing us to participate in the care of this patient. We will follow patient. Please dont hesitate to call with any questions    Camilo Carson MD  11/23/2021    [facility] [address]  Phone - [phone]   Fax - [phone]  [email or web address]

## 2021-11-23 NOTE — CONSULTS
[facility]      Cardiology Care Note                                    Initial visit      Patient Name: Seevro Rees - :1970 - UTV:[id number]  Primary Cardiologist: Michelle Arriola MD  Consulting Cardiologist: Laura Valladares MD  Reason for Consult: persistent orthostatic hypotension. Assessment and Plan     1. Orthostasis   -Unclear etiology. Has had recent normal lexiscan stress   -Recent echo 2021 with NL EF, mild MVP and mild MR   -Agree with trial of IVF today per renal   -Continue midoddrine 10 mg TID   -She reports florinef not effective in past.   -Ideally would recommend stopping/weaning of Effexor, Buspar and Lyrica (1 medication at a time) as these medications can contribute to orthostasis. -Recommend thigh high RADHA hose   -Stim test per renal   -Will check TSH   -check plasma catecholamines   -Recommend tilt table test. Will see if can be done tomorrow, otherwise will plan for outpatient. 2. Mild MVT, mild MR    3. OCTAVIO on CKD IV   -renal following   -had transplant eval at [facility]  4. Mild anemia   hgb 10.2           ____________________________________________________________      HPI:  Ms. oFx Kim is a 54 y.o. female with PMH of orthostatic hypotension on midodrine, CKD stage IV (undergoing evaluation at [facility] for kidney transplant), mild Mitral valve prolapse and mild MR, syncope (last in 2021), neuropathy, covid 19 (in 2021). Pt presents with chief c/o of weakness, BUE weakness and legs giving out. States she also experiences numbness and tingling in BLE when up. She reports that these symptoms are preceded by lightheadedness. She reports these episodes especially occur when she first gets up in a.m. before her dose of midodrine. She reports having history of intermittent diarrhea. She reports she has been hydrating but her urine output has decreased.     She had episode this a.m. of standing up with SBP drop to 60's with associated lightheadedness, resolved with returning no bed. Reports that she developed orthostasis in approximately 11/2020 and it has progressively worsened. She was on florinef at one time but this did not help and there was concern for it's affect on kidneys. Dr. Juarez Ashby started her on midodrine in 2/22/21 and this has been titrated up to TID. She reports that the midodrine does help but she still experiences orthostasis. Her transplant specialist at [facility] concerned for effects of orthostasis on any future transplanted kidney and thus she reports she is being referred to an endocrinologist.   Patient seen on the day of progress note and examined  and agree with Advance Practice Provider (LOGAN, NP,PA)  assessment and plans. Patient with symptoms orthostatic hypotension. In my opinion potential pots needs to be ruled out. She is agreeable to proceed with tilt table test.  In the meanwhile I will recommend continue same dose of midodrine. Add compression stockings. Depending on the results of the tilt table test we may consider the use of beta-blockers or Corlanor. Pyridostigmine on hold for now. Agree with gentle hydration although this may lead to some degree of fact the tilt table test.  We will not give more than 1 L today and tomorrow. Cardiac testing history:   . Saw Dr. Juarez Ashby in 2/2021 for abnormal EKG noted in ER visit. As well as relative hypotension with syncope. Had Onnie Moles nuclear stress test which was normal  09/29/21    ECHO ADULT COMPLETE 09/29/2021 9/29/2021    Interpretation Summary  · LV: Estimated LVEF is 55 - 60%. Visually measured ejection fraction. Normal cavity size, wall thickness and systolic function (ejection fraction normal). Normal left ventricular strain. Global longitudinal strain is -18.3%. Mild (grade 1) left ventricular diastolic dysfunction. · MV: Mitral valve thickening. Mitral valve prolapse.  Mild mitral valve regurgitation is present. · TV: Right Ventricular Arterial Pressure (RVSP) is 24 mmHg. Pulmonary hypertension not suggested by Doppler findings. GLS: 2/23/2021= -16.5%, Today= -18.3%. Signed by: Evans Miranda MD on 9/29/2021  9:52 PM        09/29/21    NUCLEAR CARDIAC STRESS TEST 09/29/2021 10/1/2021    Interpretation Summary  · SPECT: Left ventricular perfusion is normal. Myocardial perfusion imaging supports a low risk stress test.  · SPECT: Left ventricular function post-stress was normal. Calculated ejection fraction is 68% (normal EF value is equal to or greater than 50%). Left ventricular wall motion was normal at stress, no regional wall motion abnormality noted. There is no evidence of transient ischemic dilation (TID). The TID ratio is 1. 12.  · Baseline ECG: Normal sinus rhythm. · Stress test: Stress EKG normal.    Signed by: Evans Miranda MD on 9/29/2021 10:16 PM      Patient Active Problem List   Diagnosis Code    Unspecified essential hypertension I10    Anxiety F41.9    Depression F32. A    ETOH abuse F10.10    Syncope R55     No specialty comments available.       Review of Systems:    [] Patient unable to provide secondary to condition    CONSTITUTIONAL: negative     ENT/MOUTH: negative     EYES: negative    CARDIOVASCULAR: orthostasis     RESPIRATORY: occasional SOB with events of orthostasis      GASTROINTESTINAL: diarrhea     GENITOURINARY: decreased U/O     MUSCULOSKELETAL: back pain      SKIN: negative    NEUROLOGICAL: weakness  , numbness, paresthesias , lightheadedness     PSYCHOLOGICAL: negative      HEME/LYMPH: negative    ENDOCRINE: negative        Past Medical History:   Diagnosis Date    Chronic kidney disease     right kidney function 8%, 1/4 size    Depression     Hypertension     Seizures (HCC)     Stage 4 chronic kidney disease ([facility] [address]      Past Surgical History:   Procedure Laterality Date    HX GYN      4 pregnancies/ 3 children/1 miscarriage    OR BREAST SURGERY PROCEDURE UNLISTED  2007    augmentation     Current Facility-Administered Medications   Medication Dose Route Frequency    sodium chloride (NS) flush 5-40 mL  5-40 mL IntraVENous Q8H    sodium chloride (NS) flush 5-40 mL  5-40 mL IntraVENous PRN    acetaminophen (TYLENOL) tablet 650 mg  650 mg Oral Q6H PRN    Or    acetaminophen (TYLENOL) suppository 650 mg  650 mg Rectal Q6H PRN    polyethylene glycol (MIRALAX) packet 17 g  17 g Oral DAILY PRN    ondansetron (ZOFRAN ODT) tablet 4 mg  4 mg Oral Q8H PRN    Or    ondansetron (ZOFRAN) injection 4 mg  4 mg IntraVENous Q6H PRN    heparin (porcine) injection 5,000 Units  5,000 Units SubCUTAneous Q8H    busPIRone (BUSPAR) tablet 10 mg  10 mg Oral BID    midodrine (PROAMATINE) tablet 10 mg  10 mg Oral TID WITH MEALS    pregabalin (LYRICA) capsule 50 mg  50 mg Oral BID PRN    venlafaxine-SR (EFFEXOR-XR) capsule 150 mg  150 mg Oral QHS       No Known Allergies     FmHx: family history includes Cancer in her mother and paternal grandmother; Depression in her father; Diabetes in her maternal grandfather. Social Hx :  reports that she quit smoking about 25 years ago. Her smoking use included cigarettes. She has never used smokeless tobacco. She reports previous alcohol use of about 20.8 standard drinks of alcohol per week. She reports that she does not use drugs. Objective:    Physical Exam    Vitals:   Vitals:    11/23/21 0327 11/23/21 0409 11/23/21 0607 11/23/21 0828   BP: 110/72   105/62   Pulse: (!) 103 99 (!) 101 (!) 101   Resp:    16   Temp:    97.8 °F (36.6 °C)   SpO2:    100%   Weight:       Height:           General:    Alert, cooperative, no distress, appears stated age. Neck:   Supple,    Back:     Symmetric,    Lungs:     Clear to auscultation bilaterally. Heart[de-identified]    Regular rate and rhythm, S1, S2 normal, no murmur, click, rub or gallop. Abdomen:     Soft, non-tender.  Bowel sounds normal.    Extremities:   Extremities normal, atraumatic, no cyanosis or edema. Vascular:   Pulses -2+   Skin:   Skin color normal. No rashes or lesions on visible areas   Neurologic:   Alert, oriented MARTINEZ        Telemetry:     ECG:   EKG Results     Procedure 720 Value Units Date/Time    EKG 12 LEAD INITIAL [[id number]] Collected: 11/22/21 1728    Order Status: Completed Updated: 11/22/21 2039     Ventricular Rate 102 BPM      Atrial Rate 102 BPM      P-R Interval 132 ms      QRS Duration 72 ms      Q-T Interval 350 ms      QTC Calculation (Bezet) 456 ms      Calculated P Axis 81 degrees      Calculated R Axis 73 degrees      Calculated T Axis 83 degrees      Diagnosis --     Sinus tachycardia  Biatrial enlargement  Abnormal ECG  When compared with ECG of 27-MAY-2021 13:16,  No significant change was found  Confirmed by Warden Casper MD ([id number]) on 11/22/2021 8:39:37 PM            Data Review:     Radiology:   XR Results (most recent):  Results from [place] encounter on 11/22/21    XR CHEST PORT    Narrative  EXAM:  XR CHEST PORT    INDICATION:   Chest Pain    COMPARISON: Chest radiograph 9/29/2021. FINDINGS: AP radiograph of the chest was obtained. No evidence of focal consolidation. No pleural effusion or pneumothorax. Heart,  gaston, mediastinum are within normal limits. No acute osseous abnormalities. Impression  No acute cardiopulmonary process. No results for input(s): CPK, TROIQ in the last 72 hours. No lab exists for component: CKQMB, CPKMB, BMPP  Recent Labs     11/23/21  0810 11/22/21  1735   * 127*   K 4.0 3.5   CL 96* 91*   CO2 24 21   BUN 65* 63*   CREA 4.44* 4.73*   * 104*   CA 9.1 9.4     Recent Labs     11/22/21  1745   WBC 11.4*   HGB 10.2*   HCT 32.7*   *     Recent Labs     11/22/21  1735   AP 63     No results for input(s): CHOL, LDLC in the last 72 hours. No lab exists for component: TGL, HDLC,  HBA1C  No results for input(s): CRP, TSH, TSHEXT in the last 72 hours.     No lab exists for component: [facility]    [facility].  EMERALD Lowe    [facility] [address] [place] [address] 13, [address] [address],[address] [place] [address]  [phone]      Zaida Peterson MD
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
## 2021-11-23 NOTE — ED NOTES
TRANSFER - OUT REPORT:    Verbal report given to Danita LAZARO RN(name) on Rufus Jay  being transferred to 569(unit) for routine progression of care     Report consisted of patients Situation, Background, Assessment and   Recommendations(SBAR). Information from the following report(s) SBAR, ED Summary and Recent Results was reviewed with the receiving nurse. Lines:   Peripheral IV 11/22/21 Left Forearm (Active)   Site Assessment Clean, dry, & intact 11/22/21 1731   Phlebitis Assessment 0 11/22/21 1731   Infiltration Assessment 0 11/22/21 1731   Dressing Status Clean, dry, & intact 11/22/21 1731   Dressing Type Transparent; Tape 11/22/21 1731   Hub Color/Line Status Green; Flushed; Patent 11/22/21 1731   Action Taken Blood drawn 11/22/21 1731        Opportunity for questions and clarification was provided.       Patient transported with:   Monitor

## 2021-11-23 NOTE — ED NOTES
Bedside verbal report given to [facility]. Transfer Assessment: Patient A&O x4 and in no distress. Physical re-examination reveals improvement in pts condition with reassessment of vital signs completed at the time of admission transfer.   Verification of hospital location [facility] and room 569 completed with EMS provider

## 2021-11-23 NOTE — PROGRESS NOTES
Problem: Mobility Impaired (Adult and Pediatric)  Goal: *Acute Goals and Plan of Care (Insert Text)  Description: FUNCTIONAL STATUS PRIOR TO ADMISSION: Patient was independent and active without use of DME. Recently with 3 falls related to syncope and feeling \"shaky. \" Also with tingling/numbness in UEs during these episodes. Able to get up off the floor after falling with increased time. Stay at home mom. HOME SUPPORT PRIOR TO ADMISSION: The patient lived with her  who works from home and three teenage daughters (16 16 23years old). Physical Therapy Goals  Initiated 11/23/2021  1. Patient will move from supine to sit and sit to supine , scoot up and down, and roll side to side in bed with modified independence within 7 day(s). 2.  Patient will transfer from bed to chair and chair to bed with modified independence using the least restrictive device within 7 day(s). 3.  Patient will perform sit to stand with modified independence within 7 day(s). 4.  Patient will ambulate with modified independence for 150 feet with the least restrictive device within 7 day(s). 5.  Patient will ascend/descend 7 stairs with right handrail(s) with modified independence within 7 day(s). Outcome: Progressing Towards Goal   PHYSICAL THERAPY EVALUATION  Patient: Jelena Garsia (52 y.o. female)  Date: 11/23/2021  Primary Diagnosis: Syncope [R55]        Precautions:   Fall      ASSESSMENT  Based on the objective data described below, the patient presents with impaired balance in static stand, increased dizziness and knee buckling in standing, and increased fall risk following admission for syncope. Patient with good strength and bed mobility but with nearly immediate onset of symptoms in standing of numbness/tingling in hands, knee buckling, and trunk sway, requiring seated rest. VS below-- technically not orthostatic.      Vitals:    11/23/21 1435 11/23/21 1440 11/23/21 1442 11/23/21 1500   BP: 106/69 99/65 96/69 106/71   BP 2:       BP 1 Location:  Left upper arm Left upper arm Left upper arm   BP Patient Position: Supine Sitting Standing Sitting   Pulse: (!) 110 (!) 118 (!) 123 (!) 121   Pulse 2:       Temp:       Resp:       Height:       Weight:       SpO2:             Current Level of Function Impacting Discharge (mobility/balance): min A in standing, unable to progress to ambulation due to symptoms    Functional Outcome Measure: The patient scored Total Score: 5/28 on the Tinetti outcome measure which is indicative of high fall risk. Other factors to consider for discharge: frequent falls      Patient will benefit from skilled therapy intervention to address the above noted impairments. PLAN :  Recommendations and Planned Interventions: bed mobility training, transfer training, gait training, therapeutic exercises, neuromuscular re-education, patient and family training/education, and therapeutic activities      Frequency/Duration: Patient will be followed by physical therapy:  3 times a week to address goals. Recommendation for discharge: (in order for the patient to meet his/her long term goals)  To be determined: based on acute progress, doubt she needs formal therapy if symptoms resolve with medical treatment    This discharge recommendation:  Has not yet been discussed the attending provider and/or case management    IF patient discharges home will need the following DME: none         SUBJECTIVE:   Patient stated I'm feeling it now.  re: symptoms in standing    OBJECTIVE DATA SUMMARY:   HISTORY:    Past Medical History:   Diagnosis Date    Chronic kidney disease     right kidney function 8%, 1/4 size    Depression     Hypertension     Seizures ([facility] [address]     Stage 4 chronic kidney disease ([facility] [address]      Past Surgical History:   Procedure Laterality Date    HX GYN      4 pregnancies/ 3 children/1 miscarriage    OR BREAST SURGERY PROCEDURE UNLISTED  2007    augmentation       Personal factors and/or comorbidities impacting plan of care: PMH    Home Situation  Home Environment: Private residence  # Steps to Enter: 7  Rails to Enter: Yes  Hand Rails : Right  One/Two Story Residence: Two story, live on 1st floor  Living Alone: Yes  Support Systems: Child(jarvis)  Current DME Used/Available at Home: Crutches  Tub or Shower Type: Shower    EXAMINATION/PRESENTATION/DECISION MAKING:   Range Of Motion:  AROM: Within functional limits  Strength:    Strength: Within functional limits  Tone & Sensation:   Tone: Normal  Sensation: Impaired (tingling in fingers, and neuropathy in feet)  Coordination:  Coordination: Within functional limits  Functional Mobility:  Bed Mobility:  Supine to Sit: Modified independent  Sit to Supine: Modified independent  Transfers:  Sit to Stand: Stand-by assistance  Stand to Sit: Minimum assistance  Balance:   Sitting: Intact  Standing: Impaired; Without support  Standing - Static: Good  Standing - Dynamic : Fair    Functional Measure:  Tinetti test:    Sitting Balance: 1  Arises: 1  Attempts to Rise: 2  Immediate Standing Balance: 1  Standing Balance: 0  Nudged: 0  Eyes Closed: 0  Turn 360 Degrees - Continuous/Discontinuous: 0  Turn 360 Degrees - Steady/Unsteady: 0  Sitting Down: 0  Balance Score: 5 Balance total score  Indication of Gait: 0  R Step Length/Height: 0  L Step Length/Height: 0  R Foot Clearance: 0  L Foot Clearance: 0  Step Symmetry: 0  Step Continuity: 0  Path: 0  Trunk: 0  Walking Time: 0  Gait Score: 0 Gait total score  Total Score: 5/28 Overall total score         Tinetti Tool Score Risk of Falls  <19 = High Fall Risk  19-24 = Moderate Fall Risk  25-28 = Low Fall Risk  Tinetti ME. Performance-Oriented Assessment of Mobility Problems in Elderly Patients. Thomas 66; [id number].  (Scoring Description: PT Bulletin Feb. 10, 1993)    Older adults: Darlys Litten et al, 2009; n = [address] elderly evaluated with ABC, RA, ADL, and IADL)  · Mean RA score for males aged 69-68 years = 26.21(3.40)  · Mean RA score for females age 69-68 years = 25.16(4.30)  · Mean RA score for males over 80 years = 23.29(6.02)  · Mean RA score for females over [de-identified] years = 17.20(8.32)        Physical Therapy Evaluation Charge Determination   History Examination Presentation Decision-Making   HIGH Complexity :3+ comorbidities / personal factors will impact the outcome/ POC  HIGH Complexity : 4+ Standardized tests and measures addressing body structure, function, activity limitation and / or participation in recreation  LOW Complexity : Stable, uncomplicated  Other outcome measures Tinetti 5/28  HIGH       Based on the above components, the patient evaluation is determined to be of the following complexity level: LOW     Pain Rating:  Denied pain    Activity Tolerance:   signs and symptoms of orthostatic hypotension      After treatment patient left in no apparent distress:   Supine in bed, Call bell within reach, Caregiver / family present, and Side rails x 3    COMMUNICATION/EDUCATION:   The patients plan of care was discussed with: Registered nurse. Fall prevention education was provided and the patient/caregiver indicated understanding., Patient/family have participated as able in goal setting and plan of care. , and Patient/family agree to work toward stated goals and plan of care.     Thank you for this referral.  Hector Lab, PT, DPT   Time Calculation: 18 mins

## 2021-11-23 NOTE — PROGRESS NOTES
TRANSFER - IN REPORT:    Verbal report received from April, RN (name) on Dillan Marion  being received from [place] ED(unit) for routine progression of care      Report consisted of patients Situation, Background, Assessment and   Recommendations(SBAR). Information from the following report(s) SBAR, Kardex, Intake/Output, MAR, Accordion and Recent Results was reviewed with the receiving nurse. Opportunity for questions and clarification was provided. Assessment completed upon patients arrival to unit and care assumed.

## 2021-11-23 NOTE — PROGRESS NOTES
11/23/21 0327   Vital Signs   Pulse (Heart Rate) (!) 103   Level of Consciousness Alert (0)   /72   MAP (Calculated) 85   BP 1 Method Automatic   BP 1 Location Left upper arm   BP Patient Position Lying   More BP/Pulse rows needed? Yes   Additional Blood Pressure/Pulse Data   Pulse 2 111   BP 2 103/71   MAP 2 (Calculated) 82   BP 2 Location Left arm   BP Method 2 Automatic   Patient Position 2 Sitting   Pulse 3 112   BP 3 (!) 85/59   MAP 3 (Calculated) 68   BP 3 Location Left arm   BP Method 3 Automatic   Patient Position 3 Standing     RN assessed orthostatic blood pressures of patient. Patient safely returned to bed and educated on the importance of calling RN/nurses station whenever she needs to get up.

## 2021-11-23 NOTE — PROGRESS NOTES
Bedside and Verbal shift change report given to SUNNY Mclaughlin (oncoming nurse) by Samara Martinez RN (offgoing nurse). Report included the following information SBAR, Kardex, Procedure Summary, Intake/Output, MAR, Accordion and Recent Results.

## 2021-11-23 NOTE — PROGRESS NOTES
Primary Nurse Nicole Hawkins RN and Reyes Alcide, RN performed a dual skin assessment on this patient No impairment noted  Rory score is 23.
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
## 2021-11-24 VITALS
HEART RATE: 92 BPM | DIASTOLIC BLOOD PRESSURE: 74 MMHG | RESPIRATION RATE: 15 BRPM | TEMPERATURE: 98.1 F | WEIGHT: 124.56 LBS | SYSTOLIC BLOOD PRESSURE: 104 MMHG | BODY MASS INDEX: 19.55 KG/M2 | HEIGHT: 67 IN | OXYGEN SATURATION: 100 %

## 2021-11-24 LAB
ANION GAP SERPL CALC-SCNC: 8 MMOL/L (ref 5–15)
BUN SERPL-MCNC: 57 MG/DL (ref 6–20)
BUN/CREAT SERPL: 15 (ref 12–20)
CA-I BLD-MCNC: NORMAL MMOL/L (ref 1.12–1.32)
CALCIUM SERPL-MCNC: 7.8 MG/DL (ref 8.5–10.1)
CHLORIDE BLD-SCNC: NORMAL MMOL/L (ref 98–107)
CHLORIDE SERPL-SCNC: 102 MMOL/L (ref 97–108)
CO2 SERPL-SCNC: 24 MMOL/L (ref 21–32)
CREAT BLD-MCNC: NORMAL MG/DL (ref 0.6–1.3)
CREAT SERPL-MCNC: 3.81 MG/DL (ref 0.55–1.02)
GLUCOSE BLD-MCNC: NORMAL MG/DL (ref 65–100)
GLUCOSE SERPL-MCNC: 84 MG/DL (ref 65–100)
POTASSIUM BLD-SCNC: NORMAL MMOL/L (ref 3.5–5.1)
POTASSIUM SERPL-SCNC: 3.1 MMOL/L (ref 3.5–5.1)
SERVICE CMNT-IMP: NORMAL
SODIUM BLD-SCNC: NORMAL MMOL/L (ref 136–145)
SODIUM SERPL-SCNC: 134 MMOL/L (ref 136–145)

## 2021-11-24 PROCEDURE — G0378 HOSPITAL OBSERVATION PER HR: HCPCS

## 2021-11-24 PROCEDURE — 96372 THER/PROPH/DIAG INJ SC/IM: CPT

## 2021-11-24 PROCEDURE — 74011250637 HC RX REV CODE- 250/637: Performed by: HOSPITALIST

## 2021-11-24 PROCEDURE — 99231 SBSQ HOSP IP/OBS SF/LOW 25: CPT | Performed by: SPECIALIST

## 2021-11-24 PROCEDURE — 80048 BASIC METABOLIC PNL TOTAL CA: CPT

## 2021-11-24 PROCEDURE — 36415 COLL VENOUS BLD VENIPUNCTURE: CPT

## 2021-11-24 PROCEDURE — 74011250636 HC RX REV CODE- 250/636: Performed by: HOSPITALIST

## 2021-11-24 PROCEDURE — 99214 OFFICE O/P EST MOD 30 MIN: CPT | Performed by: INTERNAL MEDICINE

## 2021-11-24 PROCEDURE — 74011250637 HC RX REV CODE- 250/637: Performed by: INTERNAL MEDICINE

## 2021-11-24 RX ORDER — POTASSIUM CHLORIDE 750 MG/1
40 TABLET, FILM COATED, EXTENDED RELEASE ORAL
Status: COMPLETED | OUTPATIENT
Start: 2021-11-24 | End: 2021-11-24

## 2021-11-24 RX ADMIN — BUSPIRONE HYDROCHLORIDE 10 MG: 10 TABLET ORAL at 08:55

## 2021-11-24 RX ADMIN — POTASSIUM CHLORIDE 40 MEQ: 750 TABLET, FILM COATED, EXTENDED RELEASE ORAL at 08:55

## 2021-11-24 RX ADMIN — Medication 10 ML: at 07:15

## 2021-11-24 RX ADMIN — MIDODRINE HYDROCHLORIDE 10 MG: 2.5 TABLET ORAL at 07:14

## 2021-11-24 RX ADMIN — HEPARIN SODIUM 5000 UNITS: 5000 INJECTION INTRAVENOUS; SUBCUTANEOUS at 07:14

## 2021-11-24 NOTE — PROGRESS NOTES
Nephrology Progress Note  [facility]  Date of Admission : 11/22/2021    CC: Follow up for OCTAVIO on CKD       Assessment and Plan     OCTAVIO on CKD IV:  - suspect progression of underlying CKD but may have some volume depletion given her GI symptoms and hypotension  - Cr better  - hold on further IVF  - ok for d/c today from renal perspective     CKD IV:  - 2/2 reflux nephropathy, renal atrophy and chronic changes  - baseline appears to be around 3.5  - had transplant eval at [facility]     Hypokalemia:  - repletion ordered    Chronic hypotension:  - neg cardiac w/u so far  - cont midodrine  - further w/u can be done by endocrinology as an outpatient    Mild hyponatremia:  - stim test as above  - IVF as above  - monitor daily     Diffuse weakness  Neuropathy       Interval History:  Seen and examined. Cr down to 3.8 today. Feeling better after IVF. No cp, sob, n/v/d. BP stable today. stim test not performed overnight. Unclear why. Pt feeling well otherwise. Current Medications: all current  Medications have been eviewed in EPIC  Review of Systems: Pertinent items are noted in HPI. Objective:  Vitals:    Vitals:    11/24/21 0246 11/24/21 0415 11/24/21 0645 11/24/21 0854   BP: 100/64   117/83   Pulse: 95 93 88 71   Resp: 16   17   Temp: 97.6 °F (36.4 °C)   98 °F (36.7 °C)   SpO2: 100%   100%   Weight:       Height:         Intake and Output:  No intake/output data recorded. No intake/output data recorded.     Physical Examination:    General: NAD,Conversant   Neck:  Supple, no mass  Resp:  Lungs CTA B/L, no wheezing , normal respiratory effort  CV:  RRR,  no murmur or rub, no LE edema  GI:  Soft, NT, + Bowel sounds, no hepatosplenomegaly  Neurologic:  Non focal  Psych:             AAO x 3 appropriate affect   Skin:  No Rash  :  No rascon    []    High complexity decision making was performed  []    Patient is at high-risk of decompensation with multiple organ involvement    Lab Data Personally Reviewed: I have reviewed all the pertinent labs, microbiology data and radiology studies during assessment. Recent Labs     11/24/21  0259 11/23/21  0810 11/22/21  1735   * 130* 127*   K 3.1* 4.0 3.5    96* 91*   CO2 24 24 21   GLU 84 102* 104*   BUN 57* 65* 63*   CREA 3.81* 4.44* 4.73*   CA 7.8* 9.1 9.4   MG  --  2.7*  --    ALB  --   --  3.8   ALT  --   --  24     Recent Labs     11/22/21  1745   WBC 11.4*   HGB 10.2*   HCT 32.7*   *     Lab Results   Component Value Date/Time    Specimen Description: URINE 01/19/2009 06:48 PM     Lab Results   Component Value Date/Time    Culture result: NO GROWTH 5 DAYS 02/14/2021 03:30 PM    Culture result: ESCHERICHIA COLI 01/19/2009 06:48 PM     Recent Results (from the past 24 hour(s))   METABOLIC PANEL, BASIC    Collection Time: 11/24/21  2:59 AM   Result Value Ref Range    Sodium 134 (L) 136 - 145 mmol/L    Potassium 3.1 (L) 3.5 - 5.1 mmol/L    Chloride 102 97 - 108 mmol/L    CO2 24 21 - 32 mmol/L    Anion gap 8 5 - 15 mmol/L    Glucose 84 65 - 100 mg/dL    BUN 57 (H) 6 - 20 MG/DL    Creatinine 3.81 (H) 0.55 - 1.02 MG/DL    BUN/Creatinine ratio 15 12 - 20      GFR est AA 15 (L) >60 ml/min/1.73m2    GFR est non-AA 13 (L) >60 ml/min/1.73m2    Calcium 7.8 (L) 8.5 - 10.1 MG/DL               Elisabet Valenzuela MD  [facility]   [address] [address], [facility]  Phone - [phone]   Fax - [phone]  www. [facility]

## 2021-11-24 NOTE — PROGRESS NOTES
She feels better today  Ep recommendations noted  Plasm catecholamines still pending  BP reasonable  Continue midodrine and RADHA and encouraged hydration  HUT if negative endocrinological evaluation   tsh and cortisol levels noted  Ok to dc from my standpoint and follow as out patient

## 2021-11-24 NOTE — DISCHARGE INSTRUCTIONS
Discharge Instructions       PATIENT ID: Kaiser Adam  MRN: [id number]   YOB: 1970    DATE OF ADMISSION: 11/22/2021  5:21 PM    DATE OF DISCHARGE: 11/24/2021    PRIMARY CARE PROVIDER: Dipti De León MD     ATTENDING PHYSICIAN: Meagan Subramanian DO  DISCHARGING PROVIDER: Paramjit Shirley DO    To contact this individual call [phone] and ask the  to page. If unavailable ask to be transferred the Adult Hospitalist Department. DISCHARGE DIAGNOSES     Orthostatic hypotension     CONSULTATIONS: IP CONSULT TO HOSPITALIST  IP CONSULT TO CARDIOLOGY  IP CONSULT TO NEPHROLOGY  IP CONSULT TO NEPHROLOGY    PROCEDURES/SURGERIES: * No surgery found *    PENDING TEST RESULTS:   At the time of discharge the following test results are still pending: none    FOLLOW UP APPOINTMENTS:   Follow-up Information     Follow up With Specialties Details Why Contact Liseth Shah MD Family Medicine   [address] [place] [address] [phone]             ADDITIONAL CARE RECOMMENDATIONS:     Resume home medications. You will need outpatient referral to endocrinology. DISCHARGE MEDICATIONS:   See Medication Reconciliation Form    · It is important that you take the medication exactly as they are prescribed. · Keep your medication in the bottles provided by the pharmacist and keep a list of the medication names, dosages, and times to be taken in your wallet. · Do not take other medications without consulting your doctor. NOTIFY YOUR PHYSICIAN FOR ANY OF THE FOLLOWING:   Fever over 101 degrees for 24 hours. Chest pain, shortness of breath, fever, chills, nausea, vomiting, diarrhea, change in mentation, falling, weakness, bleeding. Severe pain or pain not relieved by medications. Or, any other signs or symptoms that you may have questions about.       Signed:   Paramjit Shirley DO  11/24/2021  11:50 AM

## 2021-11-24 NOTE — CONSULTS
Cardiac Electrophysiology Hospital Consultation Note   REFERRING PROVIDER: Dr Tila Watkins  Subjective:      Sana Rocha is a 54 y.o. patient who is seen for evaluation of syncope and tilt table test  She said she has been dizzy for about 2 years  She has renal failure and do not know exact cause but it is now stage V and she is waiting for renal transplant at [facility]  She has had hypotension and thought she was treated first with florinef and then midodrine and it has not gotten better  She did not have syncope but sometimes lightheadedness was so bad that she thought she might have to crawl       ECHO ADULT COMPLETE 09/29/2021 9/29/2021    Interpretation Summary  · LV: Estimated LVEF is 55 - 60%. Visually measured ejection fraction. Normal cavity size, wall thickness and systolic function (ejection fraction normal). Normal left ventricular strain. Global longitudinal strain is -18.3%. Mild (grade 1) left ventricular diastolic dysfunction. · MV: Mitral valve thickening. Mitral valve prolapse. Mild mitral valve regurgitation is present. · TV: Right Ventricular Arterial Pressure (RVSP) is 24 mmHg. Pulmonary hypertension not suggested by Doppler findings. GLS: 2/23/2021= -16.5%, Today= -18.3%. Signed by: Aleja Randolph MD on 9/29/2021  9:52 PM    Cortisol 11.5  TSH 4  Plasma catecholamines pending    Head CT no stroke or bleeding    Chest xray clear    ECG sinus tach 102 bpm    Nuclear stress test normal 9/2021    Patient Active Problem List   Diagnosis Code    Unspecified essential hypertension I10    Anxiety F41.9    Depression F32. A    ETOH abuse F10.10    Syncope R55     Current Facility-Administered Medications   Medication Dose Route Frequency Provider Last Rate Last Admin    sodium chloride (NS) flush 5-40 mL  5-40 mL IntraVENous Q8H Maite Patino MD   10 mL at 11/24/21 0715    sodium chloride (NS) flush 5-40 mL  5-40 mL IntraVENous PRN Maite Patino MD        acetaminophen (TYLENOL) tablet 650 mg  650 mg Oral Q6H PRN Cynthia Perkins MD   650 mg at 11/23/21 2103    Or    acetaminophen (TYLENOL) suppository 650 mg  650 mg Rectal Q6H PRN Cynthia Perkins MD        polyethylene glycol (MIRALAX) packet 17 g  17 g Oral DAILY PRN Cynthia Perkins MD        ondansetron (ZOFRAN ODT) tablet 4 mg  4 mg Oral Q8H PRN Cynthia Perkins MD        Or    [facility]etron [facility]) injection 4 mg  4 mg IntraVENous Q6H PRN Cynthia Perkins MD   4 mg at 11/23/21 0325    heparin (porcine) injection 5,000 Units  5,000 Units SubCUTAneous Q8H Cynthia Perkins MD   5,000 Units at 11/24/21 2604    busPIRone (BUSPAR) tablet 10 mg  10 mg Oral BID Cynthia Perkins MD   10 mg at 11/24/21 0855    midodrine (PROAMATINE) tablet 10 mg  10 mg Oral TID WITH MEALS Cynthia Perkins MD   10 mg at 11/24/21 0714    pregabalin (LYRICA) capsule 50 mg  50 mg Oral BID PRN Cynthia Perkins MD        [facility]SR [facility] capsule 150 mg  150 mg Oral QHS Sadiq Medici M, DO   150 mg at 11/23/21 2103     Current Outpatient Medications   Medication Sig Dispense Refill    gabapentin (NEURONTIN) 800 mg tablet Take 800 mg by mouth three (3) times daily.  busPIRone (BUSPAR) 10 mg tablet Take 10 mg by mouth three (3) times daily.  ondansetron (ZOFRAN ODT) 8 mg disintegrating tablet DISSOLVE 1 TABLET ON THE TONGUE TWICE A DAY AS NEEDED      midodrine (PROAMATINE) 10 mg tablet Take 1 Tab by mouth three (3) times daily (with meals). 90 Tab 2    albuterol (PROVENTIL HFA, VENTOLIN HFA, PROAIR HFA) 90 mcg/actuation inhaler INHALE 1 PUFF BY MOUTH EVERY 4 HOURS AS NEEDED      venlafaxine-SR (EFFEXOR-XR) 150 mg capsule Take 150 mg by mouth daily.  pregabalin (LYRICA) 50 mg capsule Take 50 mg by mouth two (2) times daily as needed (neuropathy/itching related to kidney disease).  amitriptyline (ELAVIL) 10 mg tablet Take  by mouth nightly.        No Known Allergies  Past Medical History:   Diagnosis Date    Chronic kidney disease     right kidney function 8%, 1/ size    Depression     Hypertension     Seizures (HCC)     Stage 4 chronic kidney disease (HCC)      Past Surgical History:   Procedure Laterality Date    HX GYN      4 pregnancies/ 3 children/1 miscarriage    KS BREAST SURGERY PROCEDURE UNLISTED  2007    augmentation     Family History   Problem Relation Age of Onset    Cancer Mother         myelofibrosis    Depression Father     Diabetes Maternal Grandfather     Cancer Paternal Grandmother         brain tumor and lung      Social History     Tobacco Use    Smoking status: Former Smoker     Types: Cigarettes     Quit date: 1996     Years since quittin.9    Smokeless tobacco: Never Used   Substance Use Topics    Alcohol use: Not Currently     Alcohol/week: 20.8 standard drinks     Types: 25 Glasses of wine per week        Review of Systems:   Constitutional: Negative for fever, chills, weight loss, + malaise/fatigue. HEENT: Negative for nosebleeds, vision changes. Respiratory: Negative for cough, hemoptysis  Cardiovascular: Negative for chest pain, palpitations, orthopnea, claudication, leg swelling, + dizziess, and no PND. Gastrointestinal: Negative for nausea, vomiting, diarrhea, blood in stool and melena. Genitourinary: Negative for dysuria, and hematuria. Musculoskeletal: Negative for myalgias, arthralgia. Skin: Negative for rash. Heme: Does not bleed or bruise easily. Neurological: Negative for speech change and focal weakness     Objective:     Visit Vitals  /74   Pulse 92   Temp 98.1 °F (36.7 °C)   Resp 15   Ht 5' 7\" (1.702 m)   Wt 124 lb 9 oz (56.5 kg)   SpO2 100%   BMI 19.51 kg/m²      Physical Exam:   Constitutional: well-developed and well-nourished. No respiratory distress. Head: Normocephalic and atraumatic. Eyes: Pupils are equal, round  ENT: hearing normal  Neck: supple. No JVD present. Cardiovascular: Normal rate, regular rhythm.  Exam reveals no gallop and no friction rub. No murmur heard. Pulmonary/Chest: Effort normal and breath sounds normal. No wheezes. Abdominal: Soft, no tenderness. Musculoskeletal: no edema. Neurological: alert, oriented. Skin: Skin is warm and dry  Psychiatric: normal mood and affect. Behavior is normal. Judgment and thought content normal.      BMP:   Lab Results   Component Value Date/Time     (L) 11/24/2021 02:59 AM    K 3.1 (L) 11/24/2021 02:59 AM     11/24/2021 02:59 AM    CO2 24 11/24/2021 02:59 AM    AGAP 8 11/24/2021 02:59 AM    GLU 84 11/24/2021 02:59 AM    BUN 57 (H) 11/24/2021 02:59 AM    CREA 3.81 (H) 11/24/2021 02:59 AM    GFRAA 15 (L) 11/24/2021 02:59 AM    GFRNA 13 (L) 11/24/2021 02:59 AM    Recent Glucose Results:   Lab Results   Component Value Date/Time    GLU 84 11/24/2021 02:59 AM       Assessment/Plan:   Hypotension  Dizziness  CKD awaiting renal transplant    Random cortisol level was normal but with stress level should be higher   With renal failure I wonder if she also has adrenal insufficiency and need cortisol stim test  If so she may need prednisone  She said [facility] transplant team has recommended the same and she wants to check with them first  If she cannot get it she will call back to our practice to see [facility] endocrinologist  For tilt test, I do not think it will add value. She appears to be hypotensive even with midodrine and could not take florinef  She should use compression stockings and perhaps consider mestinon add on for more vasoconstriction until work up for adrenal insufficiency.   She will not tolerate tilt table test without midodrine and the result of tilt test will not    Hank Sleight is not indicated unless she has neurologic orthostatic hypotension     Future Appointments   Date Time Provider Storm Ana   1/7/2022  8:40 AM MD DEBI Cartagena AMB         Thank you for involving me in this patient's care and please call with further concerns or questions. Jorge Luis Kinney M.D.   Electrophysiology/Cardiology  [facility]  [address] [place] [address]                                [phone]
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
## 2021-12-02 LAB
DOPAMINE SERPL-MCNC: NORMAL PG/ML (ref 0–48)
EPINEPH PLAS-MCNC: NORMAL PG/ML (ref 0–62)
NOREPINEPH PLAS-MCNC: NORMAL PG/ML (ref 0–874)

## 2021-12-14 ENCOUNTER — PATIENT MESSAGE (OUTPATIENT)
Dept: CARDIOLOGY CLINIC | Age: 51
End: 2021-12-14

## 2021-12-14 DIAGNOSIS — R42 DIZZINESS: ICD-10-CM

## 2021-12-14 DIAGNOSIS — R55 SYNCOPE AND COLLAPSE: ICD-10-CM

## 2021-12-14 DIAGNOSIS — I95.1 ORTHOSTATIC HYPOTENSION: Primary | ICD-10-CM

## 2021-12-14 NOTE — TELEPHONE ENCOUNTER
----- Message from Mary Cruz RN sent at 12/13/2021  3:24 PM EST -----  Regarding: FW: Tilt Table Test     ----- Message -----  From: Purnima Mata RN  Sent: 12/13/2021  12:01 PM EST  To: Mary Cruz RN  Subject: FW: Tilt Table Test                                ----- Message -----  From: Luis Shah  Sent: 12/13/2021  11:50 AM EST  To: Jaswinder Dow Nurse Pool  Subject: Tilt Table Test                                  Dr. Leon Pizarro,  I was hospitalized right before Thanksgiving for hypotension, syncope upon standing/walking, and numbness in my extremities. 2 doctors from your practice saw me while I was there. One suspected POTS and the other an adrenal gland issue. I was referred to my [facility] transplant team for follow-up. I have yet to be seen by or scheduled with my transplant team or specialists. The Transplant Director suspects POTS. I am going to [facility] with my family on Friday (a trip years in the making). I am still experiencing the same symptoms that sent me to the hospital.  Can you schedule a tilt table test for me so I can figure out whats going on with me? Thank you!

## 2021-12-15 DIAGNOSIS — R55 SYNCOPE AND COLLAPSE: ICD-10-CM

## 2021-12-15 DIAGNOSIS — I95.9 HYPOTENSION, UNSPECIFIED HYPOTENSION TYPE: ICD-10-CM

## 2021-12-15 RX ORDER — MIDODRINE HYDROCHLORIDE 10 MG/1
10 TABLET ORAL
Qty: 270 TABLET | Refills: 3 | Status: SHIPPED | OUTPATIENT
Start: 2021-12-15

## 2021-12-15 NOTE — TELEPHONE ENCOUNTER
Per VO by MD.     Future Appointments   Date Time Provider Storm Ana   1/7/2022  8:40 AM MD DEBI Aaron AMB

## 2021-12-16 ENCOUNTER — TELEPHONE (OUTPATIENT)
Dept: NEUROLOGY | Age: 51
End: 2021-12-16

## 2021-12-16 NOTE — TELEPHONE ENCOUNTER
Patient has appointment on 03-2-22 but would like a return call to discuss a test that needs to be preformed before the appointment. Please call.

## 2021-12-22 NOTE — TELEPHONE ENCOUNTER
Called and LVM for the patient to call back in regards to which test specifically she is requesting or suggested the patient could also send a message thru her Mychart.

## 2021-12-29 ENCOUNTER — TELEPHONE (OUTPATIENT)
Dept: CARDIOLOGY CLINIC | Age: 51
End: 2021-12-29

## 2021-12-29 ENCOUNTER — TELEPHONE (OUTPATIENT)
Dept: NEUROLOGY | Age: 51
End: 2021-12-29

## 2021-12-29 NOTE — TELEPHONE ENCOUNTER
----- Message from Leighton Najjar, RN sent at 12/29/2021 12:08 PM EST -----  Regarding: FW: Tilt Table Test    ----- Message -----  From: Gissel Shook  Sent: 12/29/2021  12:06 PM EST  To: River Rojas Nurse Pool  Subject: Tilt Table Test                                  Dr. Haynes Boards been running into roadblocks and delays as I try to determine if my symptoms are from POTS. Neuro cant see me until 3/2. [facility] doesnt have a POTS program and they have put me off on [facility]. Im at my wits end. I have an appt with you on 1/7. Is it possible to have a tilt table test done through your practice? At this point, Im about to go to the ER in the hopes that Ill be admitted and someone can schedule the test there. I cant exist like this!     Thank you,  Josie Ibarra

## 2021-12-29 NOTE — TELEPHONE ENCOUNTER
Cherie Downey, see if we can get a Tilt with Dr Daisy Vivas sooner than March  Can Bunny Cornea so them?

## 2021-12-29 NOTE — TELEPHONE ENCOUNTER
Thanks Eriberto  I know you are so busy with other EP stuff, maybe we can find another provider to help with Tilts, anyway   The word has gotten out so Neuro is backed up and she is a very real POTS patient so thanks for fitting her in ,I dont think you have to see her first   Venda Gallop please set up, very nice lady

## 2021-12-30 NOTE — TELEPHONE ENCOUNTER
Verified patient with two types of identifiers. After reviewing chart from insurance authorization stand point because patient has not had syncope tilt test will be denied. Will check with MDs to review records.

## 2021-12-30 NOTE — TELEPHONE ENCOUNTER
Attempted to reach patient by telephone. A message was left for return call. Called neuro to see if they need to see her in office to scheduled tilt table test. They state they do not and they will reach out to get her scheduled.

## 2022-01-06 NOTE — TELEPHONE ENCOUNTER
Looks like Neuro has her now scheduled for 1-10-22 for her tilt table test  Future Appointments   Date Time Provider Storm Perez   1/10/2022  1:00 PM ANS SCHEDULE NEUROWTC BS AMB   3/2/2022  8:00 AM Thu Ingram DO NEUSM BS AMB

## 2022-01-07 ENCOUNTER — TELEPHONE (OUTPATIENT)
Dept: NEUROLOGY | Age: 52
End: 2022-01-07

## 2022-01-13 ENCOUNTER — OFFICE VISIT (OUTPATIENT)
Dept: NEUROLOGY | Age: 52
End: 2022-01-13
Payer: COMMERCIAL

## 2022-01-13 DIAGNOSIS — R55 SYNCOPE, UNSPECIFIED SYNCOPE TYPE: Primary | ICD-10-CM

## 2022-01-13 PROCEDURE — 93660 TILT TABLE EVALUATION: CPT | Performed by: PSYCHIATRY & NEUROLOGY

## 2022-01-13 NOTE — PROCEDURES
[facility]  [address]  [facility], [address] [place], [address] [facility]  Phone: [phone]  FAX: [phone]     Clinical Autonomic Testing Report     Patient ID:  Sudheer Hester  [id number]  89 y.o.  1970     REFERRED BY: Belem Cross MD  PCP: Zita Poon MD    Date of Testin2022       Indication/History:    Episodes of tachycardia, SOB and chest pain since 2019. (+) kidney failure (+) history of alcohol abuse (+) neuropathy    Patient is coming for syncope/autonomic dysfunction evaluation. Medications taken 48 hrs before the test: Gabapentin     Procedure: Referring provider only requested Head-Up Tilt Table Testing. It is performed by utilizing [address] [place] [id number] boo-box Autonomic System, with established protocol. Result:HUT (head-up tilt) : Jaoh-he-hhbn BP and HR were measured, up to 15 minutes post tilt.   There is an exaggerated sustained increase in heart rate from baseline HR of 100 to max HR of 148 at 6 mins post tilt associated with lightheadedness without accompanying hypotension within the first 10 mins of tilt table testing      Impression:   ABNORMAL    There is an exaggerated sustained increase in heart rate (greater than 30 beats/min from baseline and increasing more than 120 beats/min) without accompanying hypotension within the first 10 mins of tilt table testing which can be seen in postural orthostatic tachycardia syndrome (POTS)       Vamshi Holland MD  Diplomate, American Board of Psychiatry and Neurology  Diplomate, Neuromuscular Medicine  Diplomate, American Board of Electrodiagnostic Medicine    Note: Raw Data will be scanned separately in Media

## 2022-02-07 ENCOUNTER — TELEPHONE (OUTPATIENT)
Dept: ENDOCRINOLOGY | Age: 52
End: 2022-02-07

## 2022-02-08 NOTE — TELEPHONE ENCOUNTER
Carleenal,    If her morning cortisol in November was 11, this is normal and there is no reason to pursue an ACTH stimulation test.  I only pursue this if a morning cortisol is way below 10, usually 5 or less so I don't think this is necessary. It appears per Dr. Shannon Ford testing that she appears to have POTS so I would consider given her a trial of IV normal saline bolus of 1.5 ml to see how she feels after this. I don't think she needs to be sen in our clinic. Let me know if you have any other questions about her. Thanks,  Penny [place]  ===View-only below this line===  ----- Message -----  From: Miley Fermin MD  Sent: 2/7/2022   3:29 PM EST  To: Dillan Hamlin MD, Mary Carmona RN  Subject: Tom Norris: Follow-Up after Tilt Table Test              Kelby, any advice/help on how to get a CortStim test done?   Thanks Jennifer

## 2022-02-15 ENCOUNTER — HOSPITAL ENCOUNTER (OUTPATIENT)
Dept: INFUSION THERAPY | Age: 52
Discharge: HOME | End: 2022-02-15
Payer: COMMERCIAL

## 2022-02-15 VITALS
WEIGHT: 121.4 LBS | BODY MASS INDEX: 19.06 KG/M2 | HEART RATE: 97 BPM | RESPIRATION RATE: 18 BRPM | HEIGHT: 67 IN | SYSTOLIC BLOOD PRESSURE: 98 MMHG | TEMPERATURE: 97.7 F | DIASTOLIC BLOOD PRESSURE: 66 MMHG | OXYGEN SATURATION: 98 %

## 2022-02-15 PROCEDURE — 74011250636 HC RX REV CODE- 250/636: Performed by: SPECIALIST

## 2022-02-15 PROCEDURE — 96361 HYDRATE IV INFUSION ADD-ON: CPT

## 2022-02-15 PROCEDURE — 96360 HYDRATION IV INFUSION INIT: CPT

## 2022-02-15 RX ADMIN — SODIUM CHLORIDE 1000 ML: 9 INJECTION, SOLUTION INTRAVENOUS at 13:20

## 2022-02-15 RX ADMIN — SODIUM CHLORIDE 500 ML: 9 INJECTION, SOLUTION INTRAVENOUS at 14:25

## 2022-02-15 NOTE — PROGRESS NOTES
[facility] Progress Note    Date: February 15, 2022    Name: Ilsa Speaker    MRN: [id number]         : 1970    Ms. Calderon Arrived ambulatory with walker and in no distress for Hydration. Assessment was completed, no acute issues at this time, pt c/o dizziness, tingling to bilateral feet. 24G PIV placed in left wrist, positive blood return noted. No labs drawn today. Ms. Calderon's vitals were reviewed. Patient Vitals for the past 12 hrs:   Temp Pulse Resp BP SpO2   02/15/22 1304 97.7 °F (36.5 °C) 97 18 98/66 98 %     Medications:  Medications Administered     sodium chloride 0.9 % bolus infusion 1,000 mL     Admin Date  02/15/2022 Action  New Bag Dose  1,000 mL Rate  1,000 mL/hr Route  IntraVENous Administered By  Too Daniel RN          sodium chloride 0.9 % bolus infusion 500 mL     Admin Date  02/15/2022 Action  New Bag Dose  500 mL Rate  1,000 mL/hr Route  IntraVENous Administered By  Too Daniel RN              Ms. Maximilian David tolerated treatment well and was discharged from [place] [address] in stable condition at 1500. PIV flushed and removed. She is to follow up with MD regarding future appointments.      Melody Shipley RN  February 15, 2022

## 2022-02-21 ENCOUNTER — TELEPHONE (OUTPATIENT)
Dept: CARDIOLOGY CLINIC | Age: 52
End: 2022-02-21

## 2022-02-23 ENCOUNTER — HOSPITAL ENCOUNTER (OUTPATIENT)
Dept: INFUSION THERAPY | Age: 52
Discharge: HOME | End: 2022-02-23
Payer: COMMERCIAL

## 2022-02-23 VITALS
SYSTOLIC BLOOD PRESSURE: 104 MMHG | HEART RATE: 97 BPM | DIASTOLIC BLOOD PRESSURE: 63 MMHG | RESPIRATION RATE: 16 BRPM | OXYGEN SATURATION: 100 % | TEMPERATURE: 97.7 F

## 2022-02-23 PROCEDURE — 96361 HYDRATE IV INFUSION ADD-ON: CPT

## 2022-02-23 PROCEDURE — 96360 HYDRATION IV INFUSION INIT: CPT

## 2022-02-23 PROCEDURE — 74011250636 HC RX REV CODE- 250/636: Performed by: SPECIALIST

## 2022-02-23 RX ADMIN — SODIUM CHLORIDE 500 ML: 900 INJECTION, SOLUTION INTRAVENOUS at 11:24

## 2022-02-23 RX ADMIN — SODIUM CHLORIDE 1000 ML: 900 INJECTION, SOLUTION INTRAVENOUS at 10:25

## 2022-02-23 NOTE — PROGRESS NOTES
[facility] Peds/Adult Note                       Date: 2022    Name: Toño Dale    MRN: [id number]         : 1970    0930 Patient arrives for Hydration  without acute problems. Please see [facility] for complete assessment and education provided. Prior to treatment, patient was screened for COVID 19. Patient denies any signs or symptoms of COVID. Denies any known physical contact with anyone diagnosed with, or with pending or positive COVID test. Denies a pending or positive COVID test himself. Vital signs stable throughout and prior to discharge. Patient tolerated procedure well and was discharged without incident. Patient is aware of next [facility] appointment on 2022 Appointment card give to the Patient      Ms. Calderon's vitals were reviewed prior to and after treatment. Patient Vitals for the past 12 hrs:   Temp Pulse Resp BP SpO2   22 1200  97  104/63    22 0944 97.7 °F (36.5 °C) (!) 129 16 (!) 86/62 100 %         Lab results were obtained and reviewed. No results found for this or any previous visit (from the past 12 hour(s)). Medications given:   Medications Administered     sodium chloride 0.9 % bolus infusion 1,000 mL     Admin Date  2022 Action  New Bag Dose  1,000 mL Rate  1,000 mL/hr Route  IntraVENous Administered By  Tracy Morgan RN          sodium chloride 0.9 % bolus infusion 500 mL     Admin Date  2022 Action  New Bag Dose  500 mL Rate  1,000 mL/hr Route  IntraVENous Administered By  Tracy Morgan RN                Ms. Walter Schreiber tolerated the hydration, and had no complaints. Ms. Walter Schreiber was discharged from [place] [address] in stable condition. Discharge Instructions provided to patient, patient verbalized understanding but denied the request for a copy of d/c instructions.      Future Appointments   Date Time Provider Storm Perez   3/2/2022 10:00 AM A3 PEDS [address] [facility]   3/16/2022 10:00 AM B4 PEDS [facility]   3/23/2022 10:00 AM B4 PEDS [facility]   3/24/2022 11:40 AM Jennifer Hernandez MD C[facility] BS AMB   5/17/2022  9:00 AM Lesia Ingram DO NEUS[facility] BS AMB       Kaden Young RN  February 23, 2022  12:09 PM

## 2022-03-02 ENCOUNTER — HOSPITAL ENCOUNTER (OUTPATIENT)
Dept: INFUSION THERAPY | Age: 52
Discharge: HOME | End: 2022-03-02

## 2022-03-17 ENCOUNTER — TELEPHONE (OUTPATIENT)
Dept: CARDIOLOGY CLINIC | Age: 52
End: 2022-03-17

## 2022-03-17 NOTE — TELEPHONE ENCOUNTER
Attempted to reach patient by telephone. A message was left advising patient call [facility] to r/s no show appointment from yesterday.

## 2022-03-19 PROBLEM — R55 SYNCOPE: Status: ACTIVE | Noted: 2021-11-22

## 2022-03-23 ENCOUNTER — HOSPITAL ENCOUNTER (OUTPATIENT)
Dept: INFUSION THERAPY | Age: 52
Discharge: HOME | End: 2022-03-23
Payer: COMMERCIAL

## 2022-03-23 VITALS
HEART RATE: 72 BPM | SYSTOLIC BLOOD PRESSURE: 103 MMHG | OXYGEN SATURATION: 100 % | DIASTOLIC BLOOD PRESSURE: 71 MMHG | TEMPERATURE: 97.6 F

## 2022-03-23 LAB
ALBUMIN SERPL-MCNC: 3.5 G/DL (ref 3.5–5)
ANION GAP SERPL CALC-SCNC: 6 MMOL/L (ref 5–15)
BUN SERPL-MCNC: 32 MG/DL (ref 6–20)
BUN/CREAT SERPL: 12 (ref 12–20)
CALCIUM SERPL-MCNC: 8.5 MG/DL (ref 8.5–10.1)
CALCIUM SERPL-MCNC: 8.7 MG/DL (ref 8.5–10.1)
CHLORIDE SERPL-SCNC: 105 MMOL/L (ref 97–108)
CO2 SERPL-SCNC: 23 MMOL/L (ref 21–32)
CREAT SERPL-MCNC: 2.77 MG/DL (ref 0.55–1.02)
GLUCOSE SERPL-MCNC: 82 MG/DL (ref 65–100)
HCT VFR BLD AUTO: 33.8 % (ref 35–47)
HGB BLD-MCNC: 10 G/DL (ref 11.5–16)
PHOSPHATE SERPL-MCNC: 3.6 MG/DL (ref 2.6–4.7)
POTASSIUM SERPL-SCNC: 2.6 MMOL/L (ref 3.5–5.1)
PTH-INTACT SERPL-MCNC: 35.8 PG/ML (ref 18.4–88)
SODIUM SERPL-SCNC: 134 MMOL/L (ref 136–145)

## 2022-03-23 PROCEDURE — 74011000250 HC RX REV CODE- 250: Performed by: SPECIALIST

## 2022-03-23 PROCEDURE — 96361 HYDRATE IV INFUSION ADD-ON: CPT

## 2022-03-23 PROCEDURE — 85018 HEMOGLOBIN: CPT

## 2022-03-23 PROCEDURE — 36415 COLL VENOUS BLD VENIPUNCTURE: CPT

## 2022-03-23 PROCEDURE — 96360 HYDRATION IV INFUSION INIT: CPT

## 2022-03-23 PROCEDURE — 83970 ASSAY OF PARATHORMONE: CPT

## 2022-03-23 PROCEDURE — 74011250636 HC RX REV CODE- 250/636: Performed by: SPECIALIST

## 2022-03-23 PROCEDURE — 80069 RENAL FUNCTION PANEL: CPT

## 2022-03-23 RX ORDER — SODIUM CHLORIDE 0.9 % (FLUSH) 0.9 %
10 SYRINGE (ML) INJECTION AS NEEDED
Status: DISCONTINUED | OUTPATIENT
Start: 2022-03-23 | End: 2022-03-24 | Stop reason: HOSPADM

## 2022-03-23 RX ORDER — POTASSIUM CHLORIDE 750 MG/1
40 TABLET, FILM COATED, EXTENDED RELEASE ORAL DAILY
COMMUNITY

## 2022-03-23 RX ADMIN — SODIUM CHLORIDE 1000 ML: 900 INJECTION, SOLUTION INTRAVENOUS at 10:25

## 2022-03-23 RX ADMIN — SODIUM CHLORIDE 500 ML: 900 INJECTION, SOLUTION INTRAVENOUS at 11:35

## 2022-03-23 RX ADMIN — SODIUM CHLORIDE, PRESERVATIVE FREE 10 ML: 5 INJECTION INTRAVENOUS at 10:20

## 2022-03-23 NOTE — PROGRESS NOTES
[address] [facility] @ [facility] VISIT NOTE     1005 Patient arrives for IV Hydration Therapy (2 of 4) without acute problems. Please see connect care for complete assessment and education provided. Vital signs stable throughout and prior to discharge, Pt. Tolerated treatment well and discharged without incident. Patient is aware of next [facility] appointment on 03/28/2022. Appointment card given to patient. The patient/parent denies any symptoms of COVID (SOB, coughing, fever, or generally not feeling well), denies any known exposure to COVID-19 recently. 1[address] from lab called with critical K+ level = 2.6   1135 RN called Paige Zavala NP per nurse - Gevena Lundborg with critical results with new orders obtained for patient to take her PO Potassium 4 tabs daily until Monday with patient to return Monday for IV hydration therapy & repeat lab work. See critical result documentation. Medications Verified by Denys Mata RN :  1. Normal Saline 1,500 ml IV Bolus over 1.5 hours  2.  NS IV Flush     VITAL SIGNS  Patient Vitals for the past 12 hrs:   Temp Pulse BP SpO2   03/23/22 1205  72 103/71    03/23/22 1007 97.6 °F (36.4 °C) (!) 112 110/73 100 %     LAB WORK  Recent Results (from the past 12 hour(s))   HGB & HCT    Collection Time: 03/23/22 10:27 AM   Result Value Ref Range    HGB 10.0 (L) 11.5 - 16.0 g/dL    HCT 33.8 (L) 35.0 - 47.0 %   PTH INTACT    Collection Time: 03/23/22 10:27 AM   Result Value Ref Range    Calcium 8.7 8.5 - 10.1 MG/DL    PTH, Intact 35.8 18.4 - 88.0 pg/mL   RENAL FUNCTION PANEL    Collection Time: 03/23/22 10:27 AM   Result Value Ref Range    Sodium 134 (L) 136 - 145 mmol/L    Potassium 2.6 (LL) 3.5 - 5.1 mmol/L    Chloride 105 97 - 108 mmol/L    CO2 23 21 - 32 mmol/L    Anion gap 6 5 - 15 mmol/L    Glucose 82 65 - 100 mg/dL    BUN 32 (H) 6 - 20 MG/DL    Creatinine 2.77 (H) 0.55 - 1.02 MG/DL    BUN/Creatinine ratio 12 12 - 20      GFR est AA 22 (L) >60 ml/min/1.73m2    GFR est non-AA 18 (L) >60 ml/min/1.73m2    Calcium 8.5 8.5 - 10.1 MG/DL    Phosphorus 3.6 2.6 - 4.7 MG/DL    Albumin 3.5 3.5 - 5.0 g/dL

## 2022-03-24 ENCOUNTER — VIRTUAL VISIT (OUTPATIENT)
Dept: CARDIOLOGY CLINIC | Age: 52
End: 2022-03-24
Payer: COMMERCIAL

## 2022-03-24 DIAGNOSIS — R55 SYNCOPE AND COLLAPSE: ICD-10-CM

## 2022-03-24 DIAGNOSIS — I34.0 NON-RHEUMATIC MITRAL REGURGITATION: ICD-10-CM

## 2022-03-24 DIAGNOSIS — I34.1 MVP (MITRAL VALVE PROLAPSE): ICD-10-CM

## 2022-03-24 DIAGNOSIS — N18.4 CKD (CHRONIC KIDNEY DISEASE) STAGE 4, GFR 15-29 ML/MIN (HCC): ICD-10-CM

## 2022-03-24 DIAGNOSIS — G90.A POTS (POSTURAL ORTHOSTATIC TACHYCARDIA SYNDROME): Primary | ICD-10-CM

## 2022-03-24 PROCEDURE — 99441 PR PHYS/QHP TELEPHONE EVALUATION 5-10 MIN: CPT | Performed by: SPECIALIST

## 2022-03-24 NOTE — PROGRESS NOTES
TELEPHONE VISIT DOCUMENTATION 3/24/2022   [facility] 1970  54 y. o.female evaluated via telephone on 3/24/2022 due to COVID 19 restrictions. Patient unable to participate in Virtual Visit with synchronous audio/visual technology.     · Tachycardia syndrome, kidney failure, history of alcohol use, neuropathy   · relative hypotension with syncope, orthostasis with acute gastroenteritis  · Started on midodrine 2-22-21 (Florinef earlier)   · Echo with mild reduction in EF-has CROCKETT,fatigue  · Fu note from Renal is now reviewed today  · Stage 4 CKD  · Covid Jan 2021  · Father with MI and valve replacement   · Nuclear July 2021 low risk tests EF 68% normal perfusion  · Tilt table Dr. Radha Srinivasan 1/13/2022 exaggerated heart rate response from heart rate of 100-1 48 at 6 minutes with lightheadedness abnormal testing consistent with POTS    Has been going regularly to the outpatient infusion center  Denies chest pain shortness of breath or lower extremity edema  Has risk of falls  Evaluated for transplant with kidney at [facility]  Occasional tachycardia  She had concerns about her cortisol level but her morning cortisol level was normal she had a tilt table test  The tilt table test was done by neurology and the findings and previously reviewed  Sustaining at 824 systolic  Salt tabs and midodrine with thigh high support hoses  Struggled Monday and Tuesday , went in for a infusion and her K low  4 tabs of K daily  Transplant feels if keep 100 will be ok  No sob, edema, cp  Back pain  Uses rolling to keep safe, had to use Monday and Tuesday   Uses infusion every week, father had CVA so missed his appts  Handicap parking sticker is ok with me    Orthostatic hypotension continue midodrine   LV dysfunction EF 50% Echo 2/23/2021  GLS -16.5  Mitral prolapse posterior leaflet    Continue with IV infusions for normal saline, renal transplant work-up, follow-up 6 months in office    The primary encounter diagnosis was POTS (postural orthostatic tachycardia syndrome). Diagnoses of Syncope and collapse, MVP (mitral valve prolapse), CKD (chronic kidney disease) stage 4, GFR 15-29 ml/min (HCC), and Non-rheumatic mitral regurgitation were also pertinent to this visit. Future Appointments   Date Time Provider Storm Ana   4/29/2022 11:00 AM B4 PEDS [facility]   5/23/2022  9:00 AM Quintin Valentino, CARIDAD CARRP BS AMB                No specialty comments available. Past Medical History:   Diagnosis Date    Chronic kidney disease     right kidney function 8%, 1/4 size    Depression     Hypertension     Seizures (HCC)     Stage 4 chronic kidney disease ([address]      reports that she quit smoking about 26 years ago. Her smoking use included cigarettes. She has never used smokeless tobacco. She reports previous alcohol use of about 20.8 standard drinks of alcohol per week. She reports that she does not use drugs. family history includes Cancer in her mother and paternal grandmother; Depression in her father; Diabetes in her maternal grandfather. Current Outpatient Medications   Medication Sig    potassium chloride SR (KLOR-CON 10) 10 mEq tablet Take 40 mEq by mouth daily. Take 4 tabs daily through Monday 03/28/2022 & then re-draw potassium on Monday 03/28. Indications: low amount of potassium in the blood    midodrine (PROAMATINE) 10 mg tablet Take 1 Tablet by mouth three (3) times daily (with meals).  gabapentin (NEURONTIN) 800 mg tablet Take 800 mg by mouth three (3) times daily.  busPIRone (BUSPAR) 10 mg tablet Take 10 mg by mouth three (3) times daily.  ondansetron (ZOFRAN ODT) 8 mg disintegrating tablet DISSOLVE 1 TABLET ON THE TONGUE TWICE A DAY AS NEEDED    albuterol (PROVENTIL HFA, VENTOLIN HFA, PROAIR HFA) 90 mcg/actuation inhaler INHALE 1 PUFF BY MOUTH EVERY 4 HOURS AS NEEDED    venlafaxine-SR (EFFEXOR-XR) 150 mg capsule Take 150 mg by mouth daily.     pregabalin (LYRICA) 50 mg capsule Take 50 mg by mouth two (2) times daily as needed (neuropathy/itching related to kidney disease).  amitriptyline (ELAVIL) 10 mg tablet Take  by mouth nightly. No current facility-administered medications for this visit. Facility-Administered Medications Ordered in Other Visits   Medication Dose Route Frequency    [START ON 3/28/2022] sodium chloride 0.9 % bolus infusion 1,000 mL  1,000 mL IntraVENous ONCE    Followed by   Naheed Haw ON 3/28/2022] sodium chloride 0.9 % bolus infusion 500 mL  500 mL IntraVENous ONCE    No Known Allergies  Consent:   Beckie Sanders and/or health care decision maker is aware that that  may receive a bill for this telephone service, depending on 's insurance coverage, and has provided verbal consent to proceed: Yes  Documentation:  I communicated with the patient and/or health care decision maker about as above  Details of this discussion including any medical advice provided: as above  I affirm this is a Patient Initiated Episode with an Established Patient who has not had a related appointment within my department in the past 7 days or scheduled within the next 24 hours. Patient was made aware and verbalized understanding that an appointment will be scheduled for them for a virtual visit and/or office visit within the above time frame. Patient understanding his/her responsibility to call and change time/date if he/she so chooses.   Note: not billable if this call serves to triage the patient into an appointment for the relevant concern  Total Time: minutes: 5-10 minutes

## 2022-03-28 ENCOUNTER — HOSPITAL ENCOUNTER (OUTPATIENT)
Dept: INFUSION THERAPY | Age: 52
Discharge: HOME | End: 2022-03-28
Payer: COMMERCIAL

## 2022-03-28 VITALS — HEART RATE: 89 BPM | SYSTOLIC BLOOD PRESSURE: 124 MMHG | TEMPERATURE: 98.1 F | DIASTOLIC BLOOD PRESSURE: 83 MMHG

## 2022-03-28 LAB
ALBUMIN SERPL-MCNC: 3.6 G/DL (ref 3.5–5)
ANION GAP SERPL CALC-SCNC: 6 MMOL/L (ref 5–15)
BUN SERPL-MCNC: 25 MG/DL (ref 6–20)
BUN/CREAT SERPL: 10 (ref 12–20)
CALCIUM SERPL-MCNC: 8.4 MG/DL (ref 8.5–10.1)
CHLORIDE SERPL-SCNC: 106 MMOL/L (ref 97–108)
CO2 SERPL-SCNC: 23 MMOL/L (ref 21–32)
CREAT SERPL-MCNC: 2.47 MG/DL (ref 0.55–1.02)
GLUCOSE SERPL-MCNC: 80 MG/DL (ref 65–100)
HCT VFR BLD AUTO: 32.4 % (ref 35–47)
HGB BLD-MCNC: 9.6 G/DL (ref 11.5–16)
IRON SATN MFR SERPL: 12 % (ref 20–50)
IRON SERPL-MCNC: 58 UG/DL (ref 35–150)
PHOSPHATE SERPL-MCNC: 2.1 MG/DL (ref 2.6–4.7)
POTASSIUM SERPL-SCNC: 3.1 MMOL/L (ref 3.5–5.1)
SODIUM SERPL-SCNC: 135 MMOL/L (ref 136–145)
TIBC SERPL-MCNC: 484 UG/DL (ref 250–450)

## 2022-03-28 PROCEDURE — 96360 HYDRATION IV INFUSION INIT: CPT

## 2022-03-28 PROCEDURE — 74011250636 HC RX REV CODE- 250/636: Performed by: SPECIALIST

## 2022-03-28 PROCEDURE — 80069 RENAL FUNCTION PANEL: CPT

## 2022-03-28 PROCEDURE — 74011000250 HC RX REV CODE- 250: Performed by: SPECIALIST

## 2022-03-28 PROCEDURE — 83540 ASSAY OF IRON: CPT

## 2022-03-28 PROCEDURE — 85018 HEMOGLOBIN: CPT

## 2022-03-28 PROCEDURE — 83970 ASSAY OF PARATHORMONE: CPT

## 2022-03-28 PROCEDURE — 36415 COLL VENOUS BLD VENIPUNCTURE: CPT

## 2022-03-28 PROCEDURE — 96361 HYDRATE IV INFUSION ADD-ON: CPT

## 2022-03-28 RX ORDER — SODIUM CHLORIDE 0.9 % (FLUSH) 0.9 %
10 SYRINGE (ML) INJECTION AS NEEDED
Status: DISCONTINUED | OUTPATIENT
Start: 2022-03-28 | End: 2022-03-29 | Stop reason: HOSPADM

## 2022-03-28 RX ADMIN — SODIUM CHLORIDE 1000 ML: 900 INJECTION, SOLUTION INTRAVENOUS at 14:15

## 2022-03-28 RX ADMIN — SODIUM CHLORIDE, PRESERVATIVE FREE 10 ML: 5 INJECTION INTRAVENOUS at 14:12

## 2022-03-28 RX ADMIN — SODIUM CHLORIDE 500 ML: 900 INJECTION, SOLUTION INTRAVENOUS at 15:18

## 2022-03-29 LAB
CALCIUM SERPL-MCNC: 8.3 MG/DL (ref 8.5–10.1)
PTH-INTACT SERPL-MCNC: 61.8 PG/ML (ref 18.4–88)

## 2022-03-30 ENCOUNTER — HOSPITAL ENCOUNTER (OUTPATIENT)
Dept: INFUSION THERAPY | Age: 52
Discharge: HOME | End: 2022-03-30

## 2022-04-08 ENCOUNTER — HOSPITAL ENCOUNTER (OUTPATIENT)
Dept: INFUSION THERAPY | Age: 52
Discharge: HOME | End: 2022-04-08
Payer: COMMERCIAL

## 2022-04-08 VITALS — DIASTOLIC BLOOD PRESSURE: 83 MMHG | TEMPERATURE: 97.8 F | SYSTOLIC BLOOD PRESSURE: 128 MMHG | HEART RATE: 85 BPM

## 2022-04-08 LAB
CREAT UR-MCNC: 36.4 MG/DL
CREAT UR-MCNC: 37.8 MG/DL
MICROALBUMIN UR-MCNC: <0.5 MG/DL
MICROALBUMIN/CREAT UR-RTO: <13 MG/G (ref 0–30)
PROT UR-MCNC: 22 MG/DL (ref 0–11.9)
PROT/CREAT UR-RTO: 0.6

## 2022-04-08 PROCEDURE — 74011250636 HC RX REV CODE- 250/636: Performed by: SPECIALIST

## 2022-04-08 PROCEDURE — 74011000250 HC RX REV CODE- 250: Performed by: SPECIALIST

## 2022-04-08 PROCEDURE — 96361 HYDRATE IV INFUSION ADD-ON: CPT

## 2022-04-08 PROCEDURE — 96360 HYDRATION IV INFUSION INIT: CPT

## 2022-04-08 PROCEDURE — 82043 UR ALBUMIN QUANTITATIVE: CPT

## 2022-04-08 PROCEDURE — 84156 ASSAY OF PROTEIN URINE: CPT

## 2022-04-08 RX ORDER — SODIUM CHLORIDE 0.9 % (FLUSH) 0.9 %
10 SYRINGE (ML) INJECTION AS NEEDED
Status: DISCONTINUED | OUTPATIENT
Start: 2022-04-08 | End: 2022-04-09 | Stop reason: HOSPADM

## 2022-04-08 RX ADMIN — SODIUM CHLORIDE 500 ML: 900 INJECTION, SOLUTION INTRAVENOUS at 14:16

## 2022-04-08 RX ADMIN — SODIUM CHLORIDE, PRESERVATIVE FREE 10 ML: 5 INJECTION INTRAVENOUS at 13:10

## 2022-04-08 RX ADMIN — SODIUM CHLORIDE 1000 ML: 900 INJECTION, SOLUTION INTRAVENOUS at 13:12

## 2022-04-08 NOTE — PROGRESS NOTES
[address] [facility] [address] @ [facility] VISIT NOTE     1300 Patient arrives for IV Hydration Therapy (4 of 4) without acute problems. Please see connect care for complete assessment and education provided. Vital signs stable throughout and prior to discharge, Pt. Tolerated treatment well and discharged without incident. Patient is aware of no further [facility] appointments @ this time & to follow up with healthcare provider for next appointment. The patient/parent denies any symptoms of COVID (SOB, coughing, fever, or generally not feeling well), denies any known exposure to COVID-19 recently. Medications Verified by Rocio Aguilar RN :  1. Normal Saline 1,500 ml IV Bolus over 1.5 hours  2. NS IV Flush     VITAL SIGNS  Patient Vitals for the past 12 hrs:   Temp Pulse BP   04/08/22 1451  85 128/83   04/08/22 1303 97.8 °F (36.6 °C) 87 (!) 146/87     LAB WORK  Labs Pending, please see connect care for results.

## 2022-04-13 ENCOUNTER — TELEPHONE (OUTPATIENT)
Dept: CARDIOLOGY CLINIC | Age: 52
End: 2022-04-13

## 2022-04-15 ENCOUNTER — HOSPITAL ENCOUNTER (OUTPATIENT)
Dept: INFUSION THERAPY | Age: 52
Discharge: HOME | End: 2022-04-15
Payer: COMMERCIAL

## 2022-04-15 VITALS
HEART RATE: 80 BPM | RESPIRATION RATE: 16 BRPM | SYSTOLIC BLOOD PRESSURE: 96 MMHG | DIASTOLIC BLOOD PRESSURE: 65 MMHG | TEMPERATURE: 98.6 F

## 2022-04-15 PROCEDURE — 96360 HYDRATION IV INFUSION INIT: CPT

## 2022-04-15 PROCEDURE — 74011250636 HC RX REV CODE- 250/636: Performed by: SPECIALIST

## 2022-04-15 PROCEDURE — 96361 HYDRATE IV INFUSION ADD-ON: CPT

## 2022-04-15 RX ORDER — SODIUM CHLORIDE 9 MG/ML
1500 INJECTION, SOLUTION INTRAVENOUS ONCE
Status: DISCONTINUED | OUTPATIENT
Start: 2022-04-15 | End: 2022-04-15

## 2022-04-15 RX ADMIN — SODIUM CHLORIDE 500 ML: 900 INJECTION, SOLUTION INTRAVENOUS at 12:16

## 2022-04-15 RX ADMIN — SODIUM CHLORIDE 1000 ML: 900 INJECTION, SOLUTION INTRAVENOUS at 11:15

## 2022-04-15 NOTE — PROGRESS NOTES
[facility] Peds/Adult Note                       Date: April 15, 2022    Name: Main Trejo    MRN: [id number]         : 1970    1100 Patient arrives for Hydration without acute problems. Please see [facility] for complete assessment and education provided. Prior to treatment, patient was screened for COVID 19. Patient denies any signs or symptoms of COVID. Denies any known physical contact with anyone diagnosed with, or with pending or positive COVID test. Denies a pending or positive COVID test himself. Vital signs stable throughout and prior to discharge. Patient tolerated procedure well and was discharged without incident. Patient is aware of next [facility] appointment on 2022 Appointment card give to the Patient      Ms. Calderon's vitals were reviewed prior to and after treatment. Patient Vitals for the past 12 hrs:   Temp Pulse Resp BP   04/15/22 1248  80 16 96/65   04/15/22 1104 98.6 °F (37 °C) 98 16 109/72       Lab results were obtained and reviewed. No results found for this or any previous visit (from the past 12 hour(s)). Medications given:   Medications Administered     sodium chloride 0.9 % bolus infusion 1,000 mL     Admin Date  04/15/2022 Action  New Bag Dose  1,000 mL Rate  1,000 mL/hr Route  IntraVENous Administered By  Laura Randall RN          sodium chloride 0.9 % bolus infusion 500 mL     Admin Date  04/15/2022 Action  New Bag Dose  500 mL Rate  1,000 mL/hr Route  IntraVENous Administered By  Laura Randall RN                  Ms. Hubbard Aase tolerated the infusion, and had no complaints. PIV was flushed and removed per protocol and without difficulty. Ms. Hubbard Aase was discharged from [place] [address] in stable condition. Discharge Instructions provided to patient, patient verbalized understanding but denied the request for a copy of d/c instructions.      Future Appointments   Date Time Provider Storm Perez   2022 11:00 AM [facility] [place]   5/17/2022  9:00 AM Thu Ingram DO NEUSM BS AMB   5/23/2022  9:00 AM Jena Valentino PA [facility] BS AMB       Veryl Lesches, RN  April 15, 2022  11:26 AM

## 2022-04-15 NOTE — TELEPHONE ENCOUNTER
Spoke to [facility] this morning, they will continue 1.5L NS every week and I will have MD sign order when he returns to office.

## 2022-04-22 ENCOUNTER — HOSPITAL ENCOUNTER (OUTPATIENT)
Dept: INFUSION THERAPY | Age: 52
Discharge: HOME | End: 2022-04-22

## 2022-04-29 ENCOUNTER — HOSPITAL ENCOUNTER (OUTPATIENT)
Dept: INFUSION THERAPY | Age: 52
Discharge: HOME | End: 2022-04-29
Payer: COMMERCIAL

## 2022-04-29 VITALS
RESPIRATION RATE: 18 BRPM | TEMPERATURE: 97.4 F | DIASTOLIC BLOOD PRESSURE: 64 MMHG | HEART RATE: 92 BPM | SYSTOLIC BLOOD PRESSURE: 102 MMHG

## 2022-04-29 PROCEDURE — 74011000250 HC RX REV CODE- 250: Performed by: SPECIALIST

## 2022-04-29 PROCEDURE — 96360 HYDRATION IV INFUSION INIT: CPT

## 2022-04-29 PROCEDURE — 74011250636 HC RX REV CODE- 250/636: Performed by: SPECIALIST

## 2022-04-29 PROCEDURE — 96361 HYDRATE IV INFUSION ADD-ON: CPT

## 2022-04-29 RX ORDER — SODIUM CHLORIDE 0.9 % (FLUSH) 0.9 %
10 SYRINGE (ML) INJECTION AS NEEDED
Status: DISCONTINUED | OUTPATIENT
Start: 2022-04-29 | End: 2022-04-30 | Stop reason: HOSPADM

## 2022-04-29 RX ADMIN — SODIUM CHLORIDE 1000 ML: 900 INJECTION, SOLUTION INTRAVENOUS at 11:25

## 2022-04-29 RX ADMIN — SODIUM CHLORIDE, PRESERVATIVE FREE 10 ML: 5 INJECTION INTRAVENOUS at 11:23

## 2022-04-29 RX ADMIN — SODIUM CHLORIDE 500 ML: 900 INJECTION, SOLUTION INTRAVENOUS at 12:30

## 2022-04-29 NOTE — PROGRESS NOTES
[address] [facility] @ [facility] VISIT NOTE     4154 Patient arrives for IV Hydration Therapy without acute problems. Please see [facility] for complete assessment and education provided. Vital signs stable throughout and prior to discharge, Pt. Tolerated treatment well and discharged without incident. Patient is aware of next [facility] appointment on 05/06/2022. Appointment card given to patient. The patient/parent denies any symptoms of COVID (SOB, coughing, fever, or generally not feeling well), denies any known exposure to COVID-19 recently. Medications Verified by Fernando Barcenas RN :  1. Normal Saline 1,500 ml IV Bolus over 1.5 hours  2.  NS IV Flush     VITAL SIGNS  Patient Vitals for the past 12 hrs:   Temp Pulse Resp BP   04/29/22 1305  92 18 102/64   04/29/22 1115 97.4 °F (36.3 °C) 90 18 120/74

## 2022-04-29 NOTE — ADDENDUM NOTE
Encounter addended by: Tesha Del Angel RN on: 4/29/2022 1:44 PM   Actions taken: Clinical Note Signed

## 2022-05-06 ENCOUNTER — HOSPITAL ENCOUNTER (OUTPATIENT)
Dept: INFUSION THERAPY | Age: 52
Discharge: HOME | End: 2022-05-06
Payer: COMMERCIAL

## 2022-05-06 VITALS
RESPIRATION RATE: 18 BRPM | SYSTOLIC BLOOD PRESSURE: 104 MMHG | TEMPERATURE: 97.7 F | HEART RATE: 88 BPM | DIASTOLIC BLOOD PRESSURE: 71 MMHG

## 2022-05-06 PROCEDURE — 96361 HYDRATE IV INFUSION ADD-ON: CPT

## 2022-05-06 PROCEDURE — 74011000250 HC RX REV CODE- 250: Performed by: SPECIALIST

## 2022-05-06 PROCEDURE — 74011250636 HC RX REV CODE- 250/636: Performed by: SPECIALIST

## 2022-05-06 PROCEDURE — 96360 HYDRATION IV INFUSION INIT: CPT

## 2022-05-06 RX ORDER — SODIUM CHLORIDE 0.9 % (FLUSH) 0.9 %
10 SYRINGE (ML) INJECTION AS NEEDED
Status: DISCONTINUED | OUTPATIENT
Start: 2022-05-06 | End: 2022-05-07 | Stop reason: HOSPADM

## 2022-05-06 RX ADMIN — SODIUM CHLORIDE 500 ML: 900 INJECTION, SOLUTION INTRAVENOUS at 12:20

## 2022-05-06 RX ADMIN — SODIUM CHLORIDE, PRESERVATIVE FREE 10 ML: 5 INJECTION INTRAVENOUS at 11:15

## 2022-05-06 RX ADMIN — SODIUM CHLORIDE 1000 ML: 900 INJECTION, SOLUTION INTRAVENOUS at 11:17

## 2022-05-06 NOTE — PROGRESS NOTES
[address] [facility] @ [facility] VISIT NOTE     1110 Patient arrives for IV Hydration Therpay without acute problems. Please see connect care for complete assessment and education provided. Vital signs stable throughout and prior to discharge, Pt. Tolerated treatment well and discharged without incident. Patient is aware of next [facility] appointment on 05/13/2022. Appointment card given to patient. The patient/parent denies any symptoms of COVID (SOB, coughing, fever, or generally not feeling well), denies any known exposure to COVID-19 recently. Medications Verified by Martir Araujo RN :  1. Normal Saline 1,500 ml IV Bolus over 1.5 hours  2.  NS IV Flush     VITAL SIGNS  Patient Vitals for the past 12 hrs:   Temp Pulse Resp BP   05/06/22 1256  88 18 104/71   05/06/22 1110 97.7 °F (36.5 °C) (!) 109 18 (!) 88/60

## 2022-05-13 ENCOUNTER — HOSPITAL ENCOUNTER (OUTPATIENT)
Dept: INFUSION THERAPY | Age: 52
Discharge: HOME | End: 2022-05-13

## 2022-05-13 RX ORDER — SODIUM CHLORIDE 0.9 % (FLUSH) 0.9 %
10 SYRINGE (ML) INJECTION AS NEEDED
Status: DISCONTINUED | OUTPATIENT
Start: 2022-05-13 | End: 2022-05-14 | Stop reason: HOSPADM

## 2022-05-20 ENCOUNTER — HOSPITAL ENCOUNTER (OUTPATIENT)
Dept: INFUSION THERAPY | Age: 52
Discharge: HOME | End: 2022-05-20

## 2022-05-23 ENCOUNTER — OFFICE VISIT (OUTPATIENT)
Dept: ORTHOPEDIC SURGERY | Age: 52
End: 2022-05-23
Payer: COMMERCIAL

## 2022-05-23 VITALS — HEIGHT: 67 IN | WEIGHT: 120 LBS | BODY MASS INDEX: 18.83 KG/M2

## 2022-05-23 DIAGNOSIS — M54.41 CHRONIC BILATERAL LOW BACK PAIN WITH BILATERAL SCIATICA: Primary | ICD-10-CM

## 2022-05-23 DIAGNOSIS — M48.02 CERVICAL STENOSIS OF SPINE: ICD-10-CM

## 2022-05-23 DIAGNOSIS — M54.2 CHRONIC NECK PAIN: ICD-10-CM

## 2022-05-23 DIAGNOSIS — G89.29 CHRONIC BILATERAL LOW BACK PAIN WITH BILATERAL SCIATICA: Primary | ICD-10-CM

## 2022-05-23 DIAGNOSIS — M54.42 CHRONIC BILATERAL LOW BACK PAIN WITH BILATERAL SCIATICA: Primary | ICD-10-CM

## 2022-05-23 DIAGNOSIS — G89.29 CHRONIC NECK PAIN: ICD-10-CM

## 2022-05-23 PROCEDURE — 99203 OFFICE O/P NEW LOW 30 MIN: CPT | Performed by: PHYSICIAN ASSISTANT

## 2022-05-23 NOTE — PROGRESS NOTES
1. Have you been to the ER, urgent care clinic since your last visit? Hospitalized since your last visit? No    2. Have you seen or consulted any other health care providers outside of the [address] [place] [address] since your last visit? Include any pap smears or colon screening.  No    Chief Complaint   Patient presents with    Back Pain
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
## 2022-05-25 ENCOUNTER — OFFICE VISIT (OUTPATIENT)
Dept: ORTHOPEDIC SURGERY | Age: 52
End: 2022-05-25
Payer: COMMERCIAL

## 2022-05-25 DIAGNOSIS — M54.41 CHRONIC BILATERAL LOW BACK PAIN WITH BILATERAL SCIATICA: ICD-10-CM

## 2022-05-25 DIAGNOSIS — G89.29 CHRONIC NECK PAIN: ICD-10-CM

## 2022-05-25 DIAGNOSIS — M54.42 CHRONIC BILATERAL LOW BACK PAIN WITH BILATERAL SCIATICA: ICD-10-CM

## 2022-05-25 DIAGNOSIS — G89.29 CHRONIC BILATERAL LOW BACK PAIN WITH BILATERAL SCIATICA: ICD-10-CM

## 2022-05-25 DIAGNOSIS — M48.02 CERVICAL STENOSIS OF SPINE: ICD-10-CM

## 2022-05-25 DIAGNOSIS — M54.2 CHRONIC NECK PAIN: ICD-10-CM

## 2022-05-25 PROCEDURE — 97110 THERAPEUTIC EXERCISES: CPT | Performed by: PHYSICAL THERAPIST

## 2022-05-25 PROCEDURE — 97161 PT EVAL LOW COMPLEX 20 MIN: CPT | Performed by: PHYSICAL THERAPIST

## 2022-05-25 NOTE — PROGRESS NOTES
Umair Almeida (: 1970) is a 54 y.o. Back Pain and Neck Pain       Patient Name: Umair Almeida  Date:2022  : 1970  [x]  Patient  Verified  Payor: Latrell Go / Plan: [address] [place] [address] / Product Type: PPO /    [place], [place]  Total Treatment Time (min): 50  Total Timed Codes (min): 50  1:1 Treatment Time ( only): N/A  Visit #:  30      Treatment Area: Low back/neck    ASSESSMENT/PLAN:  Below is the assessment and plan developed based on review of pertinent history, physical exam, labs, studies, and medications. Patient comes in today with a diagnosis of low back and neck pain and presents to physical therapy deficits including flexibility, mobility, strength, and biomechanics. She will benefit from a physical therapy program to address above-mentioned deficits. Short-term goals: To become independent with today's prescribed home exercise program in 1 week. Long-term goals: To progress with a physical therapy program so that patient demonstrates improved overall flexibility and strength allowing her to tolerate activities of daily living including swimming and gardening reporting less than 3/10 low back and neck pain in the next 4 to 6 weeks. Additionally patient will score a clinically significant improvement of 20 percentage points on the modified Oswestry scale in the next 4 to 6 weeks. Additionally patient will score a clinically significant improvement of 10 points on the neck and disability index in the next 4 to 6 weeks. Patient will be seen twice a week for up to 20 visits  with focus on progressive restoration of range of motion and strength, balance, and functional mobility. Therapeutic applications will include but are not limited to:Manual therapy, joint mobilization, myofascial release, therapeutic exercises. Modalities including ultrasound and electric stimulation heat and ice. Kinesiotape and Pete taping for joint reeducation and approximation of tissue for neuromuscular reeducation. 1. Chronic bilateral low back pain with bilateral sciatica  -     REFERRAL TO PHYSICAL THERAPY  2. Chronic neck pain  -     REFERRAL TO PHYSICAL THERAPY  3. Cervical stenosis of spine  -     REFERRAL TO PHYSICAL THERAPY      Return in about 5 days (around 5/30/2022). SUBJECTIVE:  HPI  Patient comes in today complaining of a long history of low back and neck pain. States her back pain 2 her lower lumbar and occasionally in her upper hips. The symptoms are most noticeable with walking and bending activities. She also has some discomfort when turning in bed at night. Patient complains of right more than left sided neck pain and stiffness. As her symptoms have gotten worse as she is relied more on a rolling walker. She has been using a rolling walker for the past several months to help avoid falls after being diagnosed with POTS. Patient also is being treated for end-stage kidney disease. She is trying to fly for kidney transplant. She is not yet to the stage where she needs dialysis. Takes tramadol and uses heat to deal with her back and neck pain. Patient reports having some numbness in her legs when lying on her stomach and extending her back. Reports having some neuropathy in her feet. Admits to being less active over the past several months. She does have a pool at her house and enjoys swimming. She also enjoys gardening at home. The patient's medical chart for detailed list of current medications as well as significant past medical history. OBJECTIVE:  Evaluation (20 mins)  Patient comes in today ambulating with a rolling walker with a slightly flexed posture. She is able to raise on heels and toes without difficulty. Lower Quarter scan reveals no deficits with strength or sensations of bilateral lower extremities as well as symmetrical deep tendon reflex graded 2/4 for bilateral knee and ankle.   Babinski's are downgoing bilaterally. Patient test negative for straight leg raise, discogenic, and SI provocative test bilaterally. She does have a 0.0 cm leg length discrepancy; left more than right. Hip external rotation limited by 15 degrees to contralateral side. Flexibility restriction to bilateral hamstring; left more than right. Flexibility restriction to bilateral hip flexor; left more than right. Hypomobility throughout lumbar spine. Hypertonicity to lumbar paraspinal.  Patient reports pain with low-grade bar mobilization with patient in side-lying position. Patient test fair with basic phase 1 core stabilization test.    Cervical range of motion is globally restricted. Axial line pain with Spurling's test into both left and right posterior quadrant. Upper Quarter scan reveals no deficits with strength or sensation throughout bilateral upper extremities as well as symmetrical deep tendon reflex graded 2/4 for bilateral bicep, elbow, and brachioradialis. Negative Letha sign. Negative with upper limb tension testing bilaterally. Upper tonicity to OA musculature. Patient has difficulty holding chin tuck position for more than 20 seconds. Hypomobility throughout middle and upper thoracic spine. Hypomobility throughout upper and middle cervical spine. Flexibility restriction to bilateral pectoralis minor. Modified Oswestry: 40%    Neck and disability index: 19/50        Exercise(mins30)  With today's interventions we initiated exercises for flexibility, strengthening, mobility, and biomechanics for patient perform at home on a daily basis. Today's exercises include supine hamstring and needed chest stretch, posterior pelvic tilt, chin tucks, seated flexion roll, bobble heads, bridges, and hip flexor hang stretch. An electronic signature was used to authenticate this note.   -- Raj Jacobson, PT

## 2022-05-27 ENCOUNTER — HOSPITAL ENCOUNTER (OUTPATIENT)
Dept: INFUSION THERAPY | Age: 52
Discharge: HOME | End: 2022-05-27

## 2022-06-03 ENCOUNTER — HOSPITAL ENCOUNTER (OUTPATIENT)
Dept: INFUSION THERAPY | Age: 52
Discharge: HOME | End: 2022-06-03

## 2022-07-08 NOTE — PROGRESS NOTES
ASSESSMENT/PLAN:  Below is the assessment and plan developed based on review of pertinent history, physical exam, labs, studies, and medications. 1. Knee pain, unspecified chronicity, unspecified laterality      In discussion with the patient, we considered the numerus possible diagnoses that could be contributing to their present symptoms. We also deliberated on the extensive management options that must be considered to treat their current condition. We reviewed their accessible prior medical records, diagnostic tests, and current health and employment information. We considered how these symptoms were affecting the patient´s activities of daily living as well as employment and fitness activities. The patient had various questions regarding the possible risks, benefits, complications, morbidity and mortality regarding their diagnosis and treatment options. The patients´ comorbidities were considered, and I advocated that they consider maximizing lifestyle modification through nutrition and exercise to aid in addressing their symptoms. Shared decision making yielded an understanding to move forward with conservation treatment preferences. The patient expressed understanding that if conservative management fails to alleviate the present symptoms they will return to office for re-evaluation and consideration of additional diagnostic tests and potential surgical options. In the interim, we have recommended ice, elevation, and take prescription anti-inflammatory medications along with a physician directed home exercise program. We discussed the risks and common side effects of anti-inflammatory medications and instructed the patient to discontinue the medication and contact us if they experienced any side effects. The patient was encouraged to discuss the possible side effects with their family physician or pharmacist prior to initiating any new medications.     We discussed the fact that many of the recommended treatment options presented are significantly limited by the patient´s social determinants of health. We also reviewed the circumstances surrounding the environment that they live and work which affect a wide range of health risk. We considered the limited access to appropriate educational resources regarding proper nutrition and exercise as well as the economic and social support necessary to maintain health and wellbeing. After a long discussion regarding treatment options, we have decided to prescribe an oral medication. We discussed the risks and common side effects of the medication and instructed the patient to discontinue the medication and contact us if they experienced any side effects. We also encouraged the patient to discuss the possible side effects with their family physician or pharmacist prior to initiating any new medications. We discussed with the patient various treatment options for tennis elbow. We discussed topical NSAIDs as she is unable to take any oral NSAIDs due to kidney disease. We also discussed steroid injections, physical therapy, Cho-Pat strap, wrist brace and modification of activities. We discussed that 95% of these will improve without need for surgical intervention. Would like for her to back off on doing any type of bleeding or things that require extensive  for the time being. We will get her a prescription for topical Pennsaid. We will send her to physical therapy for a few visits to learn a home exercise program and transition to do these on her own. We will have her try these various treatments and see her back in 4 to 6 weeks time for reevaluation. SUBJECTIVE/OBJECTIVE:  Severo Rees (: 1970) is a 54 y.o. female, patient here for evaluation of the No chief complaint on file. .   Patient presents today for evaluation of bilateral elbows and also her right knee. She states that the elbow is months.   She does use a walker secondary to weakness and neuropathy in her legs. She states that the elbow pain has gotten worse over the last few months. She has been doing quite a bit of weeding in the garden as well. She notices the pain on the outside aspect of the elbow which goes into the forearm. He states the right is a bit worse than the left. She has had no treatment for the elbows up to this point. She also has right knee pain. She points to the anterior lateral aspect of the knee as the location of her pain. States that this has been getting worse for the past 3 months. She denies any injury. She denies radiation of pain. She does have a neuropathy in the legs. Physical Exam    General: Well-dressed well-nourished female no distress, alert and oriented x3    Right elbow: Upon examination of the right elbow, the patient sits with normal posture. They are tender to palpation over the lateral epicondyle and extensor origin. Patient has full range of motion but discomfort with resisted wrist extension and gripping. They have 5/5 strength. The patient is neurovascularly intact distally. There is no erythema, warmth, or skin lesions present over the elbow or forearm. Left elbow: Upon examination of the left elbow, the patient sits with normal posture. They are tender to palpation over the lateral epicondyle and extensor origin. Patient has full range of motion but discomfort with resisted wrist extension and gripping. They have 5/5 strength. The patient is neurovascularly intact distally. There is no erythema, warmth, or skin lesions present over the elbow or forearm. Right knee: No effusion on exam.  Motion from 0-140 degrees. Tenderness palpation over the anterior lateral joint line, no medial joint line tenderness. No crepitus noted with passive range of motion of the knee. The knee is stable to varus and valgus stress testing.   She has a negative Lachman's, negative anterior/posterior drawer. Imaging:    Multiple views of the right knee were obtained and demonstrates well-maintained medial, lateral, patellofemoral compartment joint spaces. No fractures or dislocations seen. No Known Allergies    Current Outpatient Medications   Medication Sig    potassium chloride SR (KLOR-CON 10) 10 mEq tablet Take 40 mEq by mouth daily. Take 4 tabs daily through Monday 03/28/2022 & then re-draw potassium on Monday 03/28. Indications: low amount of potassium in the blood    midodrine (PROAMATINE) 10 mg tablet Take 1 Tablet by mouth three (3) times daily (with meals).  gabapentin (NEURONTIN) 800 mg tablet Take 800 mg by mouth three (3) times daily.  busPIRone (BUSPAR) 10 mg tablet Take 10 mg by mouth three (3) times daily.  ondansetron (ZOFRAN ODT) 8 mg disintegrating tablet DISSOLVE 1 TABLET ON THE TONGUE TWICE A DAY AS NEEDED    albuterol (PROVENTIL HFA, VENTOLIN HFA, PROAIR HFA) 90 mcg/actuation inhaler INHALE 1 PUFF BY MOUTH EVERY 4 HOURS AS NEEDED    venlafaxine-SR (EFFEXOR-XR) 150 mg capsule Take 150 mg by mouth daily.  pregabalin (LYRICA) 50 mg capsule Take 50 mg by mouth two (2) times daily as needed (neuropathy/itching related to kidney disease).  amitriptyline (ELAVIL) 10 mg tablet Take  by mouth nightly. No current facility-administered medications for this visit.        Past Medical History:   Diagnosis Date    Chronic kidney disease     right kidney function 8%, 1/4 size    Depression     Hypertension     Seizures (HCC)     Stage 4 chronic kidney disease (HCC)        Past Surgical History:   Procedure Laterality Date    HX GYN      4 pregnancies/ 3 children/1 miscarriage    [place] BREAST SURGERY PROCEDURE UNLISTED  2007    augmentation       Family History   Problem Relation Age of Onset    Cancer Mother         myelofibrosis    Depression Father     Diabetes Maternal Grandfather     Cancer Paternal Grandmother         brain tumor and lung        Social History Socioeconomic History    Marital status:      Spouse name: Not on file    Number of children: Not on file    Years of education: Not on file    Highest education level: Not on file   Occupational History    Not on file   Tobacco Use    Smoking status: Former Smoker     Types: Cigarettes     Quit date: 1996     Years since quittin.4    Smokeless tobacco: Never Used   Substance and Sexual Activity    Alcohol use: Not Currently     Alcohol/week: 20.8 standard drinks     Types: 25 Glasses of wine per week    Drug use: No    Sexual activity: Yes     Partners: Male   Other Topics Concern    Not on file   Social History Narrative    Not on file     Social Determinants of Health     Financial Resource Strain:     Difficulty of Paying Living Expenses: Not on file   Food Insecurity:     Worried About Running Out of Food in the Last Year: Not on file    Miguel A of Food in the Last Year: Not on file   Transportation Needs:     Lack of Transportation (Medical): Not on file    Lack of Transportation (Non-Medical):  Not on file   Physical Activity:     Days of Exercise per Week: Not on file    Minutes of Exercise per Session: Not on file   Stress:     Feeling of Stress : Not on file   Social Connections:     Frequency of Communication with Friends and Family: Not on file    Frequency of Social Gatherings with Friends and Family: Not on file    Attends Restorationist Services: Not on file    Active Member of [address] [place] [address] or Organizations: Not on file    Attends Club or Organization Meetings: Not on file    Marital Status: Not on file   Intimate Partner Violence:     Fear of Current or Ex-Partner: Not on file    Emotionally Abused: Not on file    Physically Abused: Not on file    Sexually Abused: Not on file   Housing Stability:     Unable to Pay for Housing in the Last Year: Not on file    Number of [place] in the Last Year: Not on file    Unstable Housing in the Last Year: Not on file Review of Systems    No flowsheet data found. Vitals: There were no vitals taken for this visit. There is no height or weight on file to calculate BMI. An electronic signature was used to authenticate this note.   -- Reggie Schulz MD

## 2022-07-11 ENCOUNTER — OFFICE VISIT (OUTPATIENT)
Dept: ORTHOPEDIC SURGERY | Age: 52
End: 2022-07-11
Payer: COMMERCIAL

## 2022-07-11 VITALS — WEIGHT: 120 LBS | HEIGHT: 67 IN | BODY MASS INDEX: 18.83 KG/M2

## 2022-07-11 DIAGNOSIS — M22.41 PATELLOFEMORAL CHONDROSIS OF RIGHT KNEE: ICD-10-CM

## 2022-07-11 DIAGNOSIS — M77.12 LATERAL EPICONDYLITIS OF BOTH ELBOWS: Primary | ICD-10-CM

## 2022-07-11 DIAGNOSIS — M25.529 ELBOW PAIN, UNSPECIFIED LATERALITY: ICD-10-CM

## 2022-07-11 DIAGNOSIS — M25.561 ACUTE PAIN OF RIGHT KNEE: ICD-10-CM

## 2022-07-11 DIAGNOSIS — M77.12 LATERAL EPICONDYLITIS, LEFT ELBOW: ICD-10-CM

## 2022-07-11 DIAGNOSIS — M77.11 LATERAL EPICONDYLITIS OF BOTH ELBOWS: Primary | ICD-10-CM

## 2022-07-11 DIAGNOSIS — M77.11 LATERAL EPICONDYLITIS OF RIGHT ELBOW: ICD-10-CM

## 2022-07-11 PROCEDURE — 99204 OFFICE O/P NEW MOD 45 MIN: CPT | Performed by: ORTHOPAEDIC SURGERY

## 2022-07-11 RX ORDER — DICLOFENAC SODIUM 20 MG/G
2 SOLUTION TOPICAL 2 TIMES DAILY
Qty: 1 EACH | Refills: 3 | Status: SHIPPED | OUTPATIENT
Start: 2022-07-11

## 2022-07-11 RX ORDER — TRAMADOL HYDROCHLORIDE 50 MG/1
TABLET ORAL
COMMUNITY
Start: 2022-06-18

## 2022-07-11 RX ORDER — BUSPIRONE HYDROCHLORIDE 15 MG/1
TABLET ORAL
COMMUNITY
Start: 2021-12-03

## 2023-01-01 NOTE — PATIENT INSTRUCTIONS
Please increase your salt intake and stay hydrated. We will see you back in about 4 months with another virtual visit. Right ear hearing screen completed date: 2023  Right ear screen method: EOAE (evoked otoacoustic emission)  Right ear screen result: Passed  Right ear screen comment: N/A    Left ear hearing screen completed date: 2023  Left ear screen method: EOAE (evoked otoacoustic emission)  Left ear screen result: Passed  Left ear screen comments: N/A

## 2024-03-11 ENCOUNTER — HOSPITAL ENCOUNTER (OUTPATIENT)
Facility: HOSPITAL | Age: 54
Discharge: HOME | End: 2024-03-14
Payer: COMMERCIAL

## 2024-03-11 DIAGNOSIS — E05.90 HYPERTHYROIDISM: ICD-10-CM

## 2024-03-11 PROCEDURE — 76536 US EXAM OF HEAD AND NECK: CPT

## 2024-05-21 ENCOUNTER — OFFICE VISIT (OUTPATIENT)
Age: 54
End: 2024-05-21
Payer: COMMERCIAL

## 2024-05-21 VITALS
HEIGHT: 67 IN | SYSTOLIC BLOOD PRESSURE: 102 MMHG | DIASTOLIC BLOOD PRESSURE: 60 MMHG | BODY MASS INDEX: 19.46 KG/M2 | WEIGHT: 124 LBS | OXYGEN SATURATION: 99 % | HEART RATE: 95 BPM

## 2024-05-21 DIAGNOSIS — N18.4 CHRONIC KIDNEY DISEASE, STAGE 4 (SEVERE) (HCC): ICD-10-CM

## 2024-05-21 DIAGNOSIS — I95.1 ORTHOSTATIC HYPOTENSION: Primary | ICD-10-CM

## 2024-05-21 DIAGNOSIS — I34.1 NONRHEUMATIC MITRAL (VALVE) PROLAPSE: ICD-10-CM

## 2024-05-21 DIAGNOSIS — Z82.49 FAMILY HISTORY OF ISCHEMIC HEART DISEASE AND OTHER DISEASES OF THE CIRCULATORY SYSTEM: ICD-10-CM

## 2024-05-21 DIAGNOSIS — I51.9 LEFT VENTRICULAR DYSFUNCTION: ICD-10-CM

## 2024-05-21 PROCEDURE — 99214 OFFICE O/P EST MOD 30 MIN: CPT | Performed by: SPECIALIST

## 2024-05-21 PROCEDURE — 93000 ELECTROCARDIOGRAM COMPLETE: CPT | Performed by: SPECIALIST

## 2024-05-21 RX ORDER — TRAZODONE HYDROCHLORIDE 100 MG/1
TABLET ORAL
COMMUNITY
Start: 2024-03-04

## 2024-05-21 RX ORDER — MIDODRINE HYDROCHLORIDE 5 MG/1
5 TABLET ORAL 2 TIMES DAILY
Qty: 180 TABLET | Refills: 1 | Status: SHIPPED | OUTPATIENT
Start: 2024-05-21

## 2024-05-21 RX ORDER — TRIAMCINOLONE ACETONIDE 1 MG/G
CREAM TOPICAL
COMMUNITY
Start: 2024-04-30

## 2024-05-21 NOTE — PATIENT INSTRUCTIONS
Restart Midodrine 5 mg twice daily. In about 3 weeks, you can increase to 10 mg twice daily if needed    Follow up with an echo in 2-3 months and see ASAD Aceves 1 week later

## 2024-05-21 NOTE — PROGRESS NOTES
Sandi Narveaz     1970       Radha Maria MD, [facility]  Date of Visit-5/21/2024   PCP is Dianna Silva MD   [facility] [place]  [facility]  HPI:  Sandi Narvaez is a 53 y.o. female   Went off her midodrine during the winter  Went on cruise to [place] in March and felt her orthostasis symptoms return  Has to use Rollator a lot   She notes some tachycardia, more dizziness and some SOB.  She has wheezing more often now with her asthma  Denies CP, edema or irregular palps, no syncope      Relative hypotension with syncope, orthostasis with acute gastroenteritis  Started on midodrine 2-22-21 (Florinef earlier)   Echo with mild reduction in EF-has WHALEY,fatigue  MVP- Echo 9/29/2021  EF 55-60%  MVP, mild MR  Nuclear 9/29/21 EF 68 and normal perfusion  Stage 4 CKD  Covid Jan 2021  Father with MI and valve replacement    EKG- SR LVH by voltate, PRWP non-specific ST-T segment/T wave findings   Assessment/Plan:     Patient Instructions   Restart Midodrine 5 mg twice daily. In about 3 weeks, you can increase to 10 mg twice daily if needed    Follow up with an echo in 2-3 months and see ASAD Aceves 1 week later      1. Orthostatic hypotension  Significant orthostasis but has improved on midodrine 10mg TID. No new syncope.   Then off in early 2024, now symptoms are back  Restart midodrine step wise   5mg BID  Then 10mg BID  Go to TID if needed     2. Left ventricular dysfunction  Mild previous reduction in ejection fraction asymptomatic  Echo 2/23/2021 EF 50% GLS -16.5  No clinical CHF stress nuclear normal in September  EKG with LVH  Check echo in 3 months     3. MVP (mitral valve prolapse)  Echo 2/23/2021 mild MVP of posterior leaflet mild MR  Repeat echo      4. CKD (chronic kidney disease) stage 4, GFR 15-29 ml/min ([facility])  Undergoing renal transplant work-up which she has cardiac risk assessment is appropriate to continue.  Blood pressures are 100 stable continue to  complains of pain/discomfort

## 2024-05-30 ENCOUNTER — TRANSCRIBE ORDERS (OUTPATIENT)
Facility: HOSPITAL | Age: 54
End: 2024-05-30

## 2024-05-30 ENCOUNTER — HOSPITAL ENCOUNTER (OUTPATIENT)
Facility: HOSPITAL | Age: 54
Discharge: HOME | End: 2024-05-30
Attending: FAMILY MEDICINE
Payer: COMMERCIAL

## 2024-05-30 DIAGNOSIS — S29.9XXA RIB INJURY: ICD-10-CM

## 2024-05-30 DIAGNOSIS — S29.9XXA RIB INJURY: Primary | ICD-10-CM

## 2024-05-30 PROCEDURE — 72070 X-RAY EXAM THORAC SPINE 2VWS: CPT

## 2024-05-30 PROCEDURE — 71101 X-RAY EXAM UNILAT RIBS/CHEST: CPT

## 2024-06-11 ENCOUNTER — HOSPITAL ENCOUNTER (INPATIENT)
Facility: HOSPITAL | Age: 54
LOS: 1 days | Discharge: HOME | DRG: 641 | End: 2024-06-12
Attending: EMERGENCY MEDICINE | Admitting: INTERNAL MEDICINE
Payer: COMMERCIAL

## 2024-06-11 DIAGNOSIS — E87.6 HYPOKALEMIA: Primary | ICD-10-CM

## 2024-06-11 LAB
ALBUMIN SERPL-MCNC: 3.8 G/DL (ref 3.5–5)
ALBUMIN/GLOB SERPL: 1.3 (ref 1.1–2.2)
ALP SERPL-CCNC: 104 U/L (ref 45–117)
ALT SERPL-CCNC: 29 U/L (ref 12–78)
ANION GAP SERPL CALC-SCNC: 9 MMOL/L (ref 5–15)
AST SERPL-CCNC: 21 U/L (ref 15–37)
BASOPHILS # BLD: 0.1 K/UL (ref 0–0.1)
BASOPHILS NFR BLD: 1 % (ref 0–1)
BILIRUB SERPL-MCNC: 0.4 MG/DL (ref 0.2–1)
BUN SERPL-MCNC: 33 MG/DL (ref 6–20)
BUN/CREAT SERPL: 12 (ref 12–20)
CALCIUM SERPL-MCNC: 8.5 MG/DL (ref 8.5–10.1)
CHLORIDE SERPL-SCNC: 101 MMOL/L (ref 97–108)
CO2 SERPL-SCNC: 28 MMOL/L (ref 21–32)
CREAT SERPL-MCNC: 2.86 MG/DL (ref 0.55–1.02)
DIFFERENTIAL METHOD BLD: ABNORMAL
EKG ATRIAL RATE: 95 BPM
EKG DIAGNOSIS: NORMAL
EKG P AXIS: 80 DEGREES
EKG P-R INTERVAL: 146 MS
EKG Q-T INTERVAL: 372 MS
EKG QRS DURATION: 78 MS
EKG QTC CALCULATION (BAZETT): 467 MS
EKG R AXIS: 61 DEGREES
EKG T AXIS: 78 DEGREES
EKG VENTRICULAR RATE: 95 BPM
EOSINOPHIL # BLD: 0.5 K/UL (ref 0–0.4)
EOSINOPHIL NFR BLD: 6 % (ref 0–7)
ERYTHROCYTE [DISTWIDTH] IN BLOOD BY AUTOMATED COUNT: 14.2 % (ref 11.5–14.5)
GLOBULIN SER CALC-MCNC: 3 G/DL (ref 2–4)
GLUCOSE SERPL-MCNC: 170 MG/DL (ref 65–100)
HCT VFR BLD AUTO: 44.4 % (ref 35–47)
HGB BLD-MCNC: 15.2 G/DL (ref 11.5–16)
IMM GRANULOCYTES # BLD AUTO: 0 K/UL (ref 0–0.04)
IMM GRANULOCYTES NFR BLD AUTO: 0 % (ref 0–0.5)
LYMPHOCYTES # BLD: 1.4 K/UL (ref 0.8–3.5)
LYMPHOCYTES NFR BLD: 16 % (ref 12–49)
MAGNESIUM SERPL-MCNC: 2.4 MG/DL (ref 1.6–2.4)
MCH RBC QN AUTO: 29.9 PG (ref 26–34)
MCHC RBC AUTO-ENTMCNC: 34.2 G/DL (ref 30–36.5)
MCV RBC AUTO: 87.2 FL (ref 80–99)
MONOCYTES # BLD: 0.4 K/UL (ref 0–1)
MONOCYTES NFR BLD: 5 % (ref 5–13)
NEUTS SEG # BLD: 6.4 K/UL (ref 1.8–8)
NEUTS SEG NFR BLD: 72 % (ref 32–75)
NRBC # BLD: 0 K/UL (ref 0–0.01)
NRBC BLD-RTO: 0 PER 100 WBC
PLATELET # BLD AUTO: 287 K/UL (ref 150–400)
PMV BLD AUTO: 11.1 FL (ref 8.9–12.9)
POTASSIUM SERPL-SCNC: 2.3 MMOL/L (ref 3.5–5.1)
PROT SERPL-MCNC: 6.8 G/DL (ref 6.4–8.2)
RBC # BLD AUTO: 5.09 M/UL (ref 3.8–5.2)
SODIUM SERPL-SCNC: 138 MMOL/L (ref 136–145)
WBC # BLD AUTO: 8.8 K/UL (ref 3.6–11)

## 2024-06-11 PROCEDURE — 2580000003 HC RX 258: Performed by: EMERGENCY MEDICINE

## 2024-06-11 PROCEDURE — 85025 COMPLETE CBC W/AUTO DIFF WBC: CPT

## 2024-06-11 PROCEDURE — 2060000000 HC ICU INTERMEDIATE R&B

## 2024-06-11 PROCEDURE — 80053 COMPREHEN METABOLIC PANEL: CPT

## 2024-06-11 PROCEDURE — 99285 EMERGENCY DEPT VISIT HI MDM: CPT

## 2024-06-11 PROCEDURE — 93005 ELECTROCARDIOGRAM TRACING: CPT | Performed by: EMERGENCY MEDICINE

## 2024-06-11 PROCEDURE — 36415 COLL VENOUS BLD VENIPUNCTURE: CPT

## 2024-06-11 PROCEDURE — 96365 THER/PROPH/DIAG IV INF INIT: CPT

## 2024-06-11 PROCEDURE — 83735 ASSAY OF MAGNESIUM: CPT

## 2024-06-11 PROCEDURE — 6360000002 HC RX W HCPCS: Performed by: EMERGENCY MEDICINE

## 2024-06-11 PROCEDURE — 6370000000 HC RX 637 (ALT 250 FOR IP): Performed by: EMERGENCY MEDICINE

## 2024-06-11 PROCEDURE — 93010 ELECTROCARDIOGRAM REPORT: CPT | Performed by: INTERNAL MEDICINE

## 2024-06-11 RX ORDER — M-VIT,TX,IRON,MINS/CALC/FOLIC 27MG-0.4MG
1 TABLET ORAL DAILY
COMMUNITY

## 2024-06-11 RX ORDER — 0.9 % SODIUM CHLORIDE 0.9 %
250 INTRAVENOUS SOLUTION INTRAVENOUS ONCE
Status: COMPLETED | OUTPATIENT
Start: 2024-06-11 | End: 2024-06-11

## 2024-06-11 RX ORDER — POTASSIUM CHLORIDE 7.45 MG/ML
10 INJECTION INTRAVENOUS ONCE
Status: COMPLETED | OUTPATIENT
Start: 2024-06-11 | End: 2024-06-11

## 2024-06-11 RX ORDER — 0.9 % SODIUM CHLORIDE 0.9 %
500 INTRAVENOUS SOLUTION INTRAVENOUS ONCE
Status: COMPLETED | OUTPATIENT
Start: 2024-06-11 | End: 2024-06-11

## 2024-06-11 RX ORDER — POTASSIUM CHLORIDE 750 MG/1
40 TABLET, FILM COATED, EXTENDED RELEASE ORAL
Status: COMPLETED | OUTPATIENT
Start: 2024-06-11 | End: 2024-06-11

## 2024-06-11 RX ADMIN — POTASSIUM CHLORIDE 10 MEQ: 7.46 INJECTION, SOLUTION INTRAVENOUS at 11:22

## 2024-06-11 RX ADMIN — SODIUM CHLORIDE 500 ML: 9 INJECTION, SOLUTION INTRAVENOUS at 11:22

## 2024-06-11 RX ADMIN — POTASSIUM CHLORIDE 40 MEQ: 750 TABLET, FILM COATED, EXTENDED RELEASE ORAL at 11:20

## 2024-06-11 RX ADMIN — SODIUM CHLORIDE 250 ML: 9 INJECTION, SOLUTION INTRAVENOUS at 15:04

## 2024-06-11 RX ADMIN — POTASSIUM CHLORIDE 10 MEQ: 7.46 INJECTION, SOLUTION INTRAVENOUS at 15:03

## 2024-06-11 ASSESSMENT — PAIN SCALES - GENERAL
PAINLEVEL_OUTOF10: 0
PAINLEVEL_OUTOF10: 4
PAINLEVEL_OUTOF10: 0

## 2024-06-11 ASSESSMENT — PAIN DESCRIPTION - LOCATION: LOCATION: RIB CAGE

## 2024-06-11 ASSESSMENT — PAIN DESCRIPTION - ORIENTATION: ORIENTATION: RIGHT

## 2024-06-11 NOTE — ED PROVIDER NOTES
[facility]  EMERGENCY DEPARTMENT ENCOUNTER      Patient Name: Sandi Narvaez  MRN: [id number]  Birthdate 1970  Date of Evaluation: 6/11/2024  Physician: Alan Holcomb MD    CHIEF COMPLAINT       Chief Complaint   Patient presents with    Abnormal Lab       HISTORY OF PRESENT ILLNESS   (Location/Symptom, Timing/Onset, Context/Setting, Quality, Duration, Modifying Factors, Severity)   Sandi Narvaez, 53 y.o., female     52-year-old female with a history of CKD, hypertension presents with a chief complaint of hypokalemia.  Patient follows with transplant nephrology at [facility] and was called today telling her that her potassium was 2.1.  She endorses diarrhea but states that it is intermittent and chronic in nature.          Nursing Notes were reviewed.    REVIEW OF SYSTEMS    (Not required)   Review of Systems    Except as noted above the remainder of the review of systems was reviewed and negative.     PAST MEDICAL HISTORY     Past Medical History:   Diagnosis Date    Chronic kidney disease     right kidney function 8%, 1/4 size    Depression     Hypertension     Seizures (HCC)     Stage 4 chronic kidney disease (HCC)        SURGICAL HISTORY       Past Surgical History:   Procedure Laterality Date    BREAST SURGERY  2007    augmentation    GYN      4 pregnancies/ 3 children/1 miscarriage       CURRENT MEDICATIONS       Previous Medications    ALBUTEROL SULFATE HFA (PROVENTIL;VENTOLIN;PROAIR) 108 (90 BASE) MCG/ACT INHALER    INHALE 1 PUFF BY MOUTH EVERY 4 HOURS AS NEEDED    AMITRIPTYLINE (ELAVIL) 10 MG TABLET    Take by mouth    BUSPIRONE (BUSPAR) 15 MG TABLET    ceived the following from [facility]: Outside name: busPIRone (BUSPAR) 15 mg tablet    DICLOFENAC SODIUM (PENNSAID) 2 % SOLN    Apply 2 Pump topically 2 times daily    GABAPENTIN (NEURONTIN) 800 MG TABLET    Take 1 tablet by mouth 3 times daily.    MIDODRINE (PROAMATINE) 5 MG TABLET    Take 1 tablet by mouth in the morning and at

## 2024-06-11 NOTE — ED TRIAGE NOTES
ED triage note Patient reports she has stage 4 kidney disease and is being seen at [facility] transplant center. States was seen yesterday and had blood work done. Received a call last night and this morning stating potassium was 2.1.

## 2024-06-11 NOTE — PROGRESS NOTES
TRANSFER - IN REPORT:    Verbal report received from Naina SHRESTHA on Sandi Narvaez  being received from [facility] ED for routine progression of patient care      Report consisted of patient's Situation, Background, Assessment and   Recommendations(SBAR).     Information from the following report(s) ED Encounter Summary was reviewed with the receiving nurse.    Opportunity for questions and clarification was provided.      Assessment completed upon patient's arrival to unit and care assumed.

## 2024-06-11 NOTE — ED NOTES
IV potassium complete at this time. Patient resting comfortably, on monitor x3 with call bell within reach.

## 2024-06-11 NOTE — ED NOTES
TRANSFER - OUT REPORT:    Verbal report given to Ann Marie Kyle PASCUAL Narvaez  being transferred to  for routine progression of patient care       Report consisted of patient's Situation, Background, Assessment and   Recommendations(SBAR).     Information from the following report(s) ED Encounter Summary, ED SBAR, MAR, Recent Results, and Cardiac Rhythm NSR  was reviewed with the receiving nurse.    [place] Fall Assessment:    Presents to emergency department  because of falls (Syncope, seizure, or loss of consciousness): No  Age > 70: No  Altered Mental Status, Intoxication with alcohol or substance confusion (Disorientation, impaired judgment, poor safety awaremess, or inability to follow instructions): No  Impaired Mobility: Ambulates or transfers with assistive devices or assistance; Unable to ambulate or transer.: No  Nursing Judgement: Yes          Lines:   Peripheral IV 06/11/24 Left;Dorsal Forearm (Active)        Opportunity for questions and clarification was provided.      Patient transported with:  Monitor

## 2024-06-12 ENCOUNTER — APPOINTMENT (OUTPATIENT)
Facility: HOSPITAL | Age: 54
DRG: 641 | End: 2024-06-12
Payer: COMMERCIAL

## 2024-06-12 VITALS
OXYGEN SATURATION: 98 % | DIASTOLIC BLOOD PRESSURE: 70 MMHG | BODY MASS INDEX: 18.74 KG/M2 | RESPIRATION RATE: 18 BRPM | HEIGHT: 68 IN | TEMPERATURE: 98.4 F | SYSTOLIC BLOOD PRESSURE: 99 MMHG | WEIGHT: 123.68 LBS | HEART RATE: 80 BPM

## 2024-06-12 LAB
ALBUMIN SERPL-MCNC: 3.2 G/DL (ref 3.5–5)
ALBUMIN/GLOB SERPL: 1.3 (ref 1.1–2.2)
ALP SERPL-CCNC: 92 U/L (ref 45–117)
ALT SERPL-CCNC: 21 U/L (ref 12–78)
ANION GAP SERPL CALC-SCNC: 10 MMOL/L (ref 5–15)
ANION GAP SERPL CALC-SCNC: 4 MMOL/L (ref 5–15)
AST SERPL-CCNC: 16 U/L (ref 15–37)
BASOPHILS # BLD: 0.1 K/UL (ref 0–0.1)
BASOPHILS NFR BLD: 1 % (ref 0–1)
BILIRUB SERPL-MCNC: 0.3 MG/DL (ref 0.2–1)
BUN SERPL-MCNC: 25 MG/DL (ref 6–20)
BUN SERPL-MCNC: 29 MG/DL (ref 6–20)
BUN/CREAT SERPL: 11 (ref 12–20)
BUN/CREAT SERPL: 13 (ref 12–20)
CALCIUM SERPL-MCNC: 8.2 MG/DL (ref 8.5–10.1)
CALCIUM SERPL-MCNC: 8.8 MG/DL (ref 8.5–10.1)
CHLORIDE SERPL-SCNC: 110 MMOL/L (ref 97–108)
CHLORIDE SERPL-SCNC: 112 MMOL/L (ref 97–108)
CO2 SERPL-SCNC: 23 MMOL/L (ref 21–32)
CO2 SERPL-SCNC: 24 MMOL/L (ref 21–32)
CREAT SERPL-MCNC: 2.27 MG/DL (ref 0.55–1.02)
CREAT SERPL-MCNC: 2.32 MG/DL (ref 0.55–1.02)
DIFFERENTIAL METHOD BLD: ABNORMAL
EOSINOPHIL # BLD: 0.5 K/UL (ref 0–0.4)
EOSINOPHIL NFR BLD: 7 % (ref 0–7)
ERYTHROCYTE [DISTWIDTH] IN BLOOD BY AUTOMATED COUNT: 14.1 % (ref 11.5–14.5)
EST. AVERAGE GLUCOSE BLD GHB EST-MCNC: 91 MG/DL
GLOBULIN SER CALC-MCNC: 2.4 G/DL (ref 2–4)
GLUCOSE SERPL-MCNC: 168 MG/DL (ref 65–100)
GLUCOSE SERPL-MCNC: 95 MG/DL (ref 65–100)
HBA1C MFR BLD: 4.8 % (ref 4–5.6)
HCT VFR BLD AUTO: 42.6 % (ref 35–47)
HGB BLD-MCNC: 14.3 G/DL (ref 11.5–16)
IMM GRANULOCYTES # BLD AUTO: 0 K/UL (ref 0–0.04)
IMM GRANULOCYTES NFR BLD AUTO: 0 % (ref 0–0.5)
LYMPHOCYTES # BLD: 1.4 K/UL (ref 0.8–3.5)
LYMPHOCYTES NFR BLD: 19 % (ref 12–49)
MAGNESIUM SERPL-MCNC: 2.4 MG/DL (ref 1.6–2.4)
MCH RBC QN AUTO: 30.2 PG (ref 26–34)
MCHC RBC AUTO-ENTMCNC: 33.6 G/DL (ref 30–36.5)
MCV RBC AUTO: 89.9 FL (ref 80–99)
MONOCYTES # BLD: 0.3 K/UL (ref 0–1)
MONOCYTES NFR BLD: 4 % (ref 5–13)
NEUTS SEG # BLD: 5 K/UL (ref 1.8–8)
NEUTS SEG NFR BLD: 69 % (ref 32–75)
NRBC # BLD: 0 K/UL (ref 0–0.01)
NRBC BLD-RTO: 0 PER 100 WBC
PHOSPHATE SERPL-MCNC: 4.7 MG/DL (ref 2.6–4.7)
PLATELET # BLD AUTO: 267 K/UL (ref 150–400)
PMV BLD AUTO: 11.4 FL (ref 8.9–12.9)
POTASSIUM SERPL-SCNC: 2.7 MMOL/L (ref 3.5–5.1)
POTASSIUM SERPL-SCNC: 3.2 MMOL/L (ref 3.5–5.1)
PROT SERPL-MCNC: 5.6 G/DL (ref 6.4–8.2)
RBC # BLD AUTO: 4.74 M/UL (ref 3.8–5.2)
SODIUM SERPL-SCNC: 139 MMOL/L (ref 136–145)
SODIUM SERPL-SCNC: 144 MMOL/L (ref 136–145)
TSH SERPL DL<=0.05 MIU/L-ACNC: 2.41 UIU/ML (ref 0.36–3.74)
WBC # BLD AUTO: 7.2 K/UL (ref 3.6–11)

## 2024-06-12 PROCEDURE — 71045 X-RAY EXAM CHEST 1 VIEW: CPT

## 2024-06-12 PROCEDURE — 2580000003 HC RX 258: Performed by: INTERNAL MEDICINE

## 2024-06-12 PROCEDURE — 83735 ASSAY OF MAGNESIUM: CPT

## 2024-06-12 PROCEDURE — 83036 HEMOGLOBIN GLYCOSYLATED A1C: CPT

## 2024-06-12 PROCEDURE — 80053 COMPREHEN METABOLIC PANEL: CPT

## 2024-06-12 PROCEDURE — 6370000000 HC RX 637 (ALT 250 FOR IP): Performed by: INTERNAL MEDICINE

## 2024-06-12 PROCEDURE — 84443 ASSAY THYROID STIM HORMONE: CPT

## 2024-06-12 PROCEDURE — 84100 ASSAY OF PHOSPHORUS: CPT

## 2024-06-12 PROCEDURE — 85025 COMPLETE CBC W/AUTO DIFF WBC: CPT

## 2024-06-12 PROCEDURE — 36415 COLL VENOUS BLD VENIPUNCTURE: CPT

## 2024-06-12 PROCEDURE — 6360000002 HC RX W HCPCS: Performed by: INTERNAL MEDICINE

## 2024-06-12 RX ORDER — SODIUM CHLORIDE 0.9 % (FLUSH) 0.9 %
5-40 SYRINGE (ML) INJECTION EVERY 12 HOURS SCHEDULED
Status: DISCONTINUED | OUTPATIENT
Start: 2024-06-12 | End: 2024-06-12 | Stop reason: HOSPADM

## 2024-06-12 RX ORDER — OXYCODONE HYDROCHLORIDE 5 MG/1
5 TABLET ORAL EVERY 4 HOURS PRN
Status: DISCONTINUED | OUTPATIENT
Start: 2024-06-12 | End: 2024-06-12 | Stop reason: HOSPADM

## 2024-06-12 RX ORDER — POLYETHYLENE GLYCOL 3350 17 G/17G
17 POWDER, FOR SOLUTION ORAL DAILY PRN
Status: DISCONTINUED | OUTPATIENT
Start: 2024-06-12 | End: 2024-06-12 | Stop reason: HOSPADM

## 2024-06-12 RX ORDER — HEPARIN SODIUM 5000 [USP'U]/ML
5000 INJECTION, SOLUTION INTRAVENOUS; SUBCUTANEOUS EVERY 8 HOURS SCHEDULED
Status: DISCONTINUED | OUTPATIENT
Start: 2024-06-12 | End: 2024-06-12 | Stop reason: HOSPADM

## 2024-06-12 RX ORDER — ONDANSETRON 4 MG/1
4 TABLET, ORALLY DISINTEGRATING ORAL EVERY 8 HOURS PRN
Status: DISCONTINUED | OUTPATIENT
Start: 2024-06-12 | End: 2024-06-12 | Stop reason: HOSPADM

## 2024-06-12 RX ORDER — POTASSIUM CHLORIDE 7.45 MG/ML
10 INJECTION INTRAVENOUS
Status: COMPLETED | OUTPATIENT
Start: 2024-06-12 | End: 2024-06-12

## 2024-06-12 RX ORDER — ACETAMINOPHEN 325 MG/1
650 TABLET ORAL EVERY 6 HOURS PRN
Status: DISCONTINUED | OUTPATIENT
Start: 2024-06-12 | End: 2024-06-12 | Stop reason: HOSPADM

## 2024-06-12 RX ORDER — HYDROMORPHONE HYDROCHLORIDE 1 MG/ML
1 INJECTION, SOLUTION INTRAMUSCULAR; INTRAVENOUS; SUBCUTANEOUS EVERY 4 HOURS PRN
Status: DISCONTINUED | OUTPATIENT
Start: 2024-06-12 | End: 2024-06-12 | Stop reason: HOSPADM

## 2024-06-12 RX ORDER — ONDANSETRON 2 MG/ML
4 INJECTION INTRAMUSCULAR; INTRAVENOUS EVERY 6 HOURS PRN
Status: DISCONTINUED | OUTPATIENT
Start: 2024-06-12 | End: 2024-06-12 | Stop reason: HOSPADM

## 2024-06-12 RX ORDER — MIDODRINE HYDROCHLORIDE 5 MG/1
5 TABLET ORAL 2 TIMES DAILY
Status: DISCONTINUED | OUTPATIENT
Start: 2024-06-12 | End: 2024-06-12 | Stop reason: HOSPADM

## 2024-06-12 RX ORDER — ALBUTEROL SULFATE 90 UG/1
2 AEROSOL, METERED RESPIRATORY (INHALATION) EVERY 4 HOURS PRN
Status: DISCONTINUED | OUTPATIENT
Start: 2024-06-12 | End: 2024-06-12 | Stop reason: HOSPADM

## 2024-06-12 RX ORDER — SODIUM CHLORIDE 9 MG/ML
INJECTION, SOLUTION INTRAVENOUS PRN
Status: DISCONTINUED | OUTPATIENT
Start: 2024-06-12 | End: 2024-06-12 | Stop reason: HOSPADM

## 2024-06-12 RX ORDER — ACETAMINOPHEN 650 MG/1
650 SUPPOSITORY RECTAL EVERY 6 HOURS PRN
Status: DISCONTINUED | OUTPATIENT
Start: 2024-06-12 | End: 2024-06-12 | Stop reason: HOSPADM

## 2024-06-12 RX ORDER — GABAPENTIN 300 MG/1
300 CAPSULE ORAL 2 TIMES DAILY
Status: DISCONTINUED | OUTPATIENT
Start: 2024-06-12 | End: 2024-06-12 | Stop reason: HOSPADM

## 2024-06-12 RX ORDER — POTASSIUM CHLORIDE 750 MG/1
40 TABLET, FILM COATED, EXTENDED RELEASE ORAL ONCE
Status: COMPLETED | OUTPATIENT
Start: 2024-06-12 | End: 2024-06-12

## 2024-06-12 RX ORDER — M-VIT,TX,IRON,MINS/CALC/FOLIC 27MG-0.4MG
1 TABLET ORAL DAILY
Status: DISCONTINUED | OUTPATIENT
Start: 2024-06-12 | End: 2024-06-12 | Stop reason: HOSPADM

## 2024-06-12 RX ORDER — TRAZODONE HYDROCHLORIDE 50 MG/1
100 TABLET ORAL NIGHTLY PRN
Status: DISCONTINUED | OUTPATIENT
Start: 2024-06-12 | End: 2024-06-12 | Stop reason: HOSPADM

## 2024-06-12 RX ORDER — SODIUM CHLORIDE, SODIUM LACTATE, POTASSIUM CHLORIDE, CALCIUM CHLORIDE 600; 310; 30; 20 MG/100ML; MG/100ML; MG/100ML; MG/100ML
INJECTION, SOLUTION INTRAVENOUS CONTINUOUS
Status: DISCONTINUED | OUTPATIENT
Start: 2024-06-12 | End: 2024-06-12 | Stop reason: HOSPADM

## 2024-06-12 RX ORDER — ENOXAPARIN SODIUM 100 MG/ML
30 INJECTION SUBCUTANEOUS DAILY
Status: DISCONTINUED | OUTPATIENT
Start: 2024-06-12 | End: 2024-06-12

## 2024-06-12 RX ORDER — SODIUM CHLORIDE 0.9 % (FLUSH) 0.9 %
5-40 SYRINGE (ML) INJECTION PRN
Status: DISCONTINUED | OUTPATIENT
Start: 2024-06-12 | End: 2024-06-12 | Stop reason: HOSPADM

## 2024-06-12 RX ORDER — VENLAFAXINE HYDROCHLORIDE 37.5 MG/1
150 CAPSULE, EXTENDED RELEASE ORAL DAILY
Status: DISCONTINUED | OUTPATIENT
Start: 2024-06-12 | End: 2024-06-12 | Stop reason: HOSPADM

## 2024-06-12 RX ADMIN — BUSPIRONE HYDROCHLORIDE 15 MG: 10 TABLET ORAL at 09:49

## 2024-06-12 RX ADMIN — POTASSIUM CHLORIDE 10 MEQ: 10 INJECTION, SOLUTION INTRAVENOUS at 11:08

## 2024-06-12 RX ADMIN — HEPARIN SODIUM 5000 UNITS: 5000 INJECTION INTRAVENOUS; SUBCUTANEOUS at 15:17

## 2024-06-12 RX ADMIN — SODIUM CHLORIDE, POTASSIUM CHLORIDE, SODIUM LACTATE AND CALCIUM CHLORIDE: 600; 310; 30; 20 INJECTION, SOLUTION INTRAVENOUS at 05:48

## 2024-06-12 RX ADMIN — Medication 1 TABLET: at 09:49

## 2024-06-12 RX ADMIN — POTASSIUM CHLORIDE 40 MEQ: 750 TABLET, FILM COATED, EXTENDED RELEASE ORAL at 07:18

## 2024-06-12 RX ADMIN — POTASSIUM CHLORIDE 10 MEQ: 10 INJECTION, SOLUTION INTRAVENOUS at 07:23

## 2024-06-12 RX ADMIN — HEPARIN SODIUM 5000 UNITS: 5000 INJECTION INTRAVENOUS; SUBCUTANEOUS at 05:48

## 2024-06-12 RX ADMIN — VENLAFAXINE HYDROCHLORIDE 150 MG: 37.5 CAPSULE, EXTENDED RELEASE ORAL at 09:49

## 2024-06-12 RX ADMIN — GABAPENTIN 300 MG: 300 CAPSULE ORAL at 09:49

## 2024-06-12 RX ADMIN — POTASSIUM CHLORIDE 10 MEQ: 10 INJECTION, SOLUTION INTRAVENOUS at 13:53

## 2024-06-12 RX ADMIN — POTASSIUM CHLORIDE 10 MEQ: 10 INJECTION, SOLUTION INTRAVENOUS at 09:53

## 2024-06-12 RX ADMIN — MIDODRINE HYDROCHLORIDE 5 MG: 5 TABLET ORAL at 09:50

## 2024-06-12 ASSESSMENT — PAIN SCALES - GENERAL
PAINLEVEL_OUTOF10: 0
PAINLEVEL_OUTOF10: 0

## 2024-06-12 NOTE — DISCHARGE INSTRUCTIONS
Discharge Instructions       PATIENT ID: Sandi Narvaez  MRN: [id number]   YOB: 1970    DATE OF ADMISSION: [unfilled]    DATE OF DISCHARGE: 6/12/2024    PRIMARY CARE PROVIDER: @PCP@     ATTENDING PHYSICIAN: [unfilled]  DISCHARGING PROVIDER: Luis E Sewell MD    To contact this individual call [phone] and ask the  to page.   If unavailable ask to be transferred the Adult Hospitalist Department.    DISCHARGE DIAGNOSES Hypokalemia    CONSULTATIONS: Nephrology    PROCEDURES/SURGERIES: * No surgery found *    PENDING TEST RESULTS:   At the time of discharge the following test results are still pending: as above    FOLLOW UP APPOINTMENTS:   PCP  Nephrology    ADDITIONAL CARE RECOMMENDATIONS:   BMP in 3-4 days    DIET: regular diet    ACTIVITY: activity as tolerated    DISCHARGE MEDICATIONS:   See Medication Reconciliation Form    It is important that you take the medication exactly as they are prescribed.   Keep your medication in the bottles provided by the pharmacist and keep a list of the medication names, dosages, and times to be taken in your wallet.   Do not take other medications without consulting your doctor.       NOTIFY YOUR PHYSICIAN FOR ANY OF THE FOLLOWING:   Fever over 101 degrees for 24 hours.   Chest pain, shortness of breath, fever, chills, nausea, vomiting, diarrhea, change in mentation, falling, weakness, bleeding. Severe pain or pain not relieved by medications.  Or, any other signs or symptoms that you may have questions about.      DISPOSITION:  x  Home With:   OT  PT  HH  RN       SNF/Inpatient Rehab/LTAC    Independent/assisted living    Hospice    Other:     CDMP Checked:   Yes x     PROBLEM LIST Updated:  Yes x       Signed:   Luis E Sewell MD  6/12/2024  10:43 AM

## 2024-06-12 NOTE — H&P
medications    Medication Sig Start Date End Date Taking? Authorizing Provider   Multiple Vitamins-Minerals (THERAPEUTIC MULTIVITAMIN-MINERALS) tablet Take 1 tablet by mouth daily   Yes ProviderSamira MD   traZODone (DESYREL) 100 MG tablet  3/4/24   Samira Webb MD   triamcinolone (KENALOG) 0.1 % cream APPLY TO AFFECTED AREA TWICE A DAY FOR 10 DAYS  Patient not taking: Reported on 6/11/2024 4/30/24   Samira Webb MD   midodrine (PROAMATINE) 5 MG tablet Take 1 tablet by mouth in the morning and at bedtime 5/21/24   Radha Maria MD   albuterol sulfate HFA (PROVENTIL;VENTOLIN;PROAIR) 108 (90 Base) MCG/ACT inhaler INHALE 1 PUFF BY MOUTH EVERY 4 HOURS AS NEEDED 1/18/21   Automatic Reconciliation, Ar   amitriptyline (ELAVIL) 10 MG tablet Take by mouth  Patient not taking: Reported on 6/11/2024    Automatic Reconciliation, Ar   busPIRone (BUSPAR) 15 MG tablet ceived the following from [facility]: Outside name: busPIRone (BUSPAR) 15 mg tablet 12/3/21   Automatic Reconciliation, Ar   Diclofenac Sodium (PENNSAID) 2 % SOLN Apply 2 Pump topically 2 times daily  Patient not taking: Reported on 6/11/2024 7/11/22   Automatic Reconciliation, Ar   gabapentin (NEURONTIN) 800 MG tablet Take 1 tablet by mouth 3 times daily.    Automatic Reconciliation, Ar   ondansetron (ZOFRAN-ODT) 8 MG TBDP disintegrating tablet DISSOLVE 1 TABLET ON THE TONGUE TWICE A DAY AS NEEDED  Patient not taking: Reported on 6/11/2024 3/4/21   Automatic Reconciliation, Ar   potassium chloride (KLOR-CON) 10 MEQ extended release tablet Take 4 tablets by mouth daily    Automatic Reconciliation, Ar   pregabalin (LYRICA) 50 MG capsule Take 1 capsule by mouth 2 times daily as needed.    Automatic Reconciliation, Ar   traMADol (ULTRAM) 50 MG tablet TAKE 1 TABLET BY MOUTH THREE TIMES A DAY AS NEEDED 6/18/22   Automatic Reconciliation, Ar   venlafaxine (EFFEXOR XR) 150 MG extended release capsule Take 1 capsule by mouth  ability given all available resources at the time of admission. Route is PO if not otherwise noted.     Family and social history were personally reviewed, all pertinent and relevant details are outlined as above.    Objective:   /80   Pulse 81   Temp 97.7 °F (36.5 °C) (Oral)   Resp 20   Ht 1.727 m (5' 8\")   Wt 56.1 kg (123 lb 10.9 oz)   SpO2 99%   BMI 18.81 kg/m²         PHYSICAL EXAM:   General appearance: Patient appears ill, in moderate distress.  Head: Normal cephalic, atraumatic.  Eyes: Normal eye movement, no redness no drainage.  Ears: Normal external ear and no drainage.  Nose: No deformity and no drainage.  Mouth and throat: No visible oral lesion.  Dry oral mucosa  Neck: Supple, no JVD no thyromegaly.  Chest: Clear breath sounds, no wheezing no crackles.  Heart: Normal S1 and S2, regular, no clinically appreciable murmur.  Abdomen: Soft nontender normal bowel sounds.  CNS: Alert oriented x3, no gross or focal neurological deficit.  Extremities: No edema, pulses 2+ bilaterally.  Musculoskeletal system: No joint deformity and swelling.  Skin: No active skin lesion seen in the exposed part of the body.  Psychiatry: Normal mood and affect.  Lymphatic system: No cervical lymphadenopathy.  Data Review:   I have independently reviewed and interpreted patient's lab and all other diagnostic data    Abnormal Labs Reviewed   CBC WITH AUTO DIFFERENTIAL - Abnormal; Notable for the following components:       Result Value    Eosinophils Absolute 0.5 (*)     All other components within normal limits   COMPREHENSIVE METABOLIC PANEL - Abnormal; Notable for the following components:    Potassium 2.3 (*)     Glucose 170 (*)     BUN 33 (*)     Creatinine 2.86 (*)     Est, Glom Filt Rate 19 (*)     All other components within normal limits       [unfilled]    IMAGING:   XR CHEST PORTABLE    (Results Pending)        ECG/ECHO:  [unfilled]       Notes reviewed from all clinical/nonclinical/nursing services

## 2024-06-12 NOTE — DISCHARGE SUMMARY
Discharge Summary       PATIENT ID: Sandi Narvaez  MRN: [id number]   YOB: 1970    DATE OF ADMISSION: 6/11/2024 10:11 AM    DATE OF DISCHARGE: 6/12/2024   PRIMARY CARE PROVIDER: Dianna Silva MD     ATTENDING PHYSICIAN: Dr Luis E Sewell  DISCHARGING PROVIDER: Luis E Sewell MD    To contact this individual call [phone] and ask the  to page.  If unavailable ask to be transferred the Adult Hospitalist Department.    CONSULTATIONS: IP CONSULT TO NEPHROLOGY    PROCEDURES/SURGERIES: * No surgery found *    ADMITTING DIAGNOSES & HOSPITAL COURSE:   Hypokalemia  CKD stage IV  -s/p 40 meq PO  -s/p 20 meq IV  -recheck bmp at noon and if continues to get better, may discharge the patient home today  -Awaiting Nephrology     POTS: Will continue midodrine and supportive therapy.  Anxiety/depression: Will resume preadmission medication.         PENDING TEST RESULTS:   At the time of discharge the following test results are still pending: none    FOLLOW UP APPOINTMENTS:    PCP  Nephrology    ADDITIONAL CARE RECOMMENDATIONS:   Repeat BMP in 3-4 days    DIET: regular diet    ACTIVITY: activity as tolerated      DISCHARGE MEDICATIONS:     Medication List        CONTINUE taking these medications      albuterol sulfate  (90 Base) MCG/ACT inhaler  Commonly known as: PROVENTIL;VENTOLIN;PROAIR     busPIRone 15 MG tablet  Commonly known as: BUSPAR     gabapentin 800 MG tablet  Commonly known as: NEURONTIN     midodrine 5 MG tablet  Commonly known as: PROAMATINE  Take 1 tablet by mouth in the morning and at bedtime     potassium chloride 10 MEQ extended release tablet  Commonly known as: KLOR-CON     pregabalin 50 MG capsule  Commonly known as: LYRICA     therapeutic multivitamin-minerals tablet     traMADol 50 MG tablet  Commonly known as: ULTRAM     traZODone 100 MG tablet  Commonly known as: DESYREL     venlafaxine 150 MG extended release capsule  Commonly known as: EFFEXOR XR            STOP taking  these medications      amitriptyline 10 MG tablet  Commonly known as: ELAVIL     ondansetron 8 MG Tbdp disintegrating tablet  Commonly known as: ZOFRAN-ODT     Pennsaid 2 % Soln  Generic drug: Diclofenac Sodium     triamcinolone 0.1 % cream  Commonly known as: KENALOG                NOTIFY YOUR PHYSICIAN FOR ANY OF THE FOLLOWING:   Fever over 101 degrees for 24 hours.   Chest pain, shortness of breath, fever, chills, nausea, vomiting, diarrhea, change in mentation, falling, weakness, bleeding. Severe pain or pain not relieved by medications.  Or, any other signs or symptoms that you may have questions about.    DISPOSITION:   x Home With:   OT  PT  HH  RN       Long term SNF/Inpatient Rehab    Independent/assisted living    Hospice    Other:       PATIENT CONDITION AT DISCHARGE:     Functional status    Poor     Deconditioned    x Independent      Cognition    x Lucid     Forgetful     Dementia      Catheters/lines (plus indication)    [place]     PICC     PEG    x None      Code status    x Full code     DNR      PHYSICAL EXAMINATION AT DISCHARGE:    General : alert x 3, awake, no acute distress,   HEENT: PEERL, EOMI, moist mucus membrane, TM clear  Neck: supple, no JVD, no meningeal signs  Chest: Clear to auscultation bilaterally   CVS: S1 S2 heard, Capillary refill less than 2 seconds  Abd: soft/ Non tender, non distended, BS physiological,   Ext: no clubbing, no cyanosis, no edema, brisk 2+ DP pulses  Neuro/Psych: pleasant mood and affect, CN 2-12 grossly intact, sensory grossly within normal limit, Strength 5/5 in all extremities, DTR 1+ x 4  Skin: warm     CHRONIC MEDICAL DIAGNOSES:      Greater than 38 minutes were spent with the patient on counseling and coordination of care    Signed:   Luis E Sewell MD  6/12/2024  10:44 AM

## 2024-06-12 NOTE — VIRTUAL HEALTH
Patient is followed by [facility]  Please redirect consult to their group    --Rita Cowan MD on 6/12/2024 at 11:30 AM    An electronic signature was used to authenticate this note.

## 2024-06-12 NOTE — PROGRESS NOTES
[facility] Adult  Hospitalist Group                                                                                          Hospitalist Progress Note  Luis E Sewell MD  Office Phone: [phone]        Date of Service:  2024  NAME:  Sandi Narvaez  :  1970  MRN:  [id number]       Admission Summary:   Sandi Narvaez is a 53 y.o. female with past medical history significant for hypertension, CKD presented at [facility] emergency room with abnormal lab result.  Patient is being seen at [facility] transplant center for her chronic kidney disease, she was seen at this center yesterday and blood work was obtained.  Patient received phone call from the [facility] transplant center informing her to go to the emergency room because her potassium is very low.  Patient also reported nonfocal generalized weakness.  Patient reported history of chronic intermittent diarrhea.  This is not associated with nausea and vomiting.  No blood or mucus in the stool.  A repeat blood work done on arrival at the emergency room confirmed the hypokalemia.  Potassium repletion was started and patient was referred to the hospitalist service for admission.       Interval history / Subjective:   Follow up Hypokalemia  4 bowel movements since admission yesterday  Wants to get discharged to home today     Assessment & Plan:     Hypokalemia  CKD stage IV  -s/p 40 meq PO  -s/p 20 meq IV  -recheck bmp at noon and if continues to get better, may discharge the patient home today  -Awaiting Nephrology    POTS: Will continue midodrine and supportive therapy.  Anxiety/depression: Will resume preadmission medication.     Regular diet    Code status: FULL CODE  Prophylaxis: scd    Plan: Follow BMP at noon and if significantly improved, will consider discharge home today  Care Plan discussed with: patient, RN, CM  Anticipated Disposition: ?today  Inpatient  Cardiac monitoring: Telemetry  Central Line:            Social    HEENT: PEERL, EOMI, moist mucus membrane, TM clear  Neck: supple, no JVD, no meningeal signs  Chest: Clear to auscultation bilaterally   CVS: S1 S2 heard, Capillary refill less than 2 seconds  Abd: soft/ non tender, non distended, BS physiological,   Ext: no clubbing, no cyanosis, no edema, brisk 2+ DP pulses  Neuro/Psych: pleasant mood and affect, CN 2-12 grossly intact, sensory grossly within normal limit, Strength 5/5 in all extremities, DTR 1+ x 4  Skin: warm     Data Review:    Review and/or order of clinical lab test      I have personally and independently reviewed all pertinent labs, diagnostic studies, imaging, and have provided independent interpretation of the same.     Labs:     Recent Labs     06/11/24  1026 06/12/24  0538   WBC 8.8 7.2   HGB 15.2 14.3   HCT 44.4 42.6    267     Recent Labs     06/11/24  1026 06/12/24  0538    144   K 2.3* 2.7*    110*   CO2 28 24   BUN 33* 29*   MG 2.4 2.4   PHOS  --  4.7     Recent Labs     06/11/24  1026 06/12/24  0538   ALT 29 21   GLOB 3.0 2.4     No results for input(s): \"INR\", \"APTT\" in the last 72 hours.    Invalid input(s): \"PTP\"   No results for input(s): \"TIBC\" in the last 72 hours.    Invalid input(s): \"FE\", \"PSAT\", \"FERR\"   No results found for: \"RBCF\"   No results for input(s): \"PH\", \"PCO2\", \"PO2\" in the last 72 hours.  No results for input(s): \"CPK\" in the last 72 hours.    Invalid input(s): \"CPKMB\", \"CKNDX\", \"TROIQ\"  No results found for: \"CHOL\", \"CHLST\", \"CHOLV\", \"HDL\", \"HDLC\", \"LDL\"  Lab Results   Component Value Date/Time    GLUCPOC 89 11/22/2021 06:18 PM    GLUCPOC PLEASE DISREGARD RESULTS 11/22/2021 05:58 PM    GLUCPOC 96 11/22/2021 05:44 PM     [unfilled]    Notes reviewed from all clinical/nonclinical/nursing services involved in patient's clinical care. Care coordination discussions were held with appropriate clinical/nonclinical/ nursing providers based on care coordination needs.         Patients current active Medications  2 seconds or less

## 2024-06-15 ENCOUNTER — TRANSCRIBE ORDERS (OUTPATIENT)
Facility: HOSPITAL | Age: 54
End: 2024-06-15

## 2024-06-15 DIAGNOSIS — Z12.31 VISIT FOR SCREENING MAMMOGRAM: Primary | ICD-10-CM

## 2024-06-15 DIAGNOSIS — Z13.6 ENCOUNTER FOR SCREENING FOR CARDIOVASCULAR DISORDERS: Primary | ICD-10-CM

## 2024-06-26 ENCOUNTER — HOSPITAL ENCOUNTER (OUTPATIENT)
Facility: HOSPITAL | Age: 54
Discharge: HOME | End: 2024-06-29
Attending: FAMILY MEDICINE

## 2024-06-26 ENCOUNTER — HOSPITAL ENCOUNTER (OUTPATIENT)
Facility: HOSPITAL | Age: 54
Discharge: HOME | End: 2024-06-29
Attending: FAMILY MEDICINE
Payer: COMMERCIAL

## 2024-06-26 DIAGNOSIS — Z12.31 VISIT FOR SCREENING MAMMOGRAM: ICD-10-CM

## 2024-06-26 DIAGNOSIS — Z13.6 ENCOUNTER FOR SCREENING FOR CARDIOVASCULAR DISORDERS: ICD-10-CM

## 2024-06-26 PROCEDURE — 77063 BREAST TOMOSYNTHESIS BI: CPT

## 2024-06-26 PROCEDURE — 75571 CT HRT W/O DYE W/CA TEST: CPT

## 2024-11-25 RX ORDER — MIDODRINE HYDROCHLORIDE 5 MG/1
TABLET ORAL
Qty: 180 TABLET | Refills: 0 | Status: SHIPPED | OUTPATIENT
Start: 2024-11-25